# Patient Record
Sex: MALE | Race: OTHER | Employment: FULL TIME | URBAN - NONMETROPOLITAN AREA
[De-identification: names, ages, dates, MRNs, and addresses within clinical notes are randomized per-mention and may not be internally consistent; named-entity substitution may affect disease eponyms.]

---

## 2019-09-24 ENCOUNTER — HOSPITAL ENCOUNTER (OUTPATIENT)
Dept: GENERAL RADIOLOGY | Age: 44
Discharge: HOME | End: 2019-09-26
Payer: MEDICARE

## 2019-09-24 ENCOUNTER — HOSPITAL ENCOUNTER (OUTPATIENT)
Age: 44
Discharge: HOME | End: 2019-09-26
Payer: MEDICARE

## 2019-09-24 DIAGNOSIS — M25.512 LEFT SHOULDER PAIN, UNSPECIFIED CHRONICITY: ICD-10-CM

## 2019-09-24 PROCEDURE — 73030 X-RAY EXAM OF SHOULDER: CPT

## 2021-01-08 ENCOUNTER — HOSPITAL ENCOUNTER (EMERGENCY)
Age: 46
Discharge: HOME | End: 2021-01-08
Attending: FAMILY MEDICINE
Payer: MEDICARE

## 2021-01-08 VITALS
HEART RATE: 79 BPM | OXYGEN SATURATION: 99 % | HEIGHT: 62 IN | TEMPERATURE: 98.1 F | SYSTOLIC BLOOD PRESSURE: 132 MMHG | BODY MASS INDEX: 26.87 KG/M2 | DIASTOLIC BLOOD PRESSURE: 98 MMHG | WEIGHT: 146 LBS | RESPIRATION RATE: 18 BRPM

## 2021-01-08 DIAGNOSIS — S39.012A STRAIN OF LUMBAR REGION, INITIAL ENCOUNTER: Primary | ICD-10-CM

## 2021-01-08 DIAGNOSIS — J06.9 VIRAL UPPER RESPIRATORY TRACT INFECTION: ICD-10-CM

## 2021-01-08 PROCEDURE — C9803 HOPD COVID-19 SPEC COLLECT: HCPCS

## 2021-01-08 PROCEDURE — 99282 EMERGENCY DEPT VISIT SF MDM: CPT

## 2021-01-08 PROCEDURE — U0003 INFECTIOUS AGENT DETECTION BY NUCLEIC ACID (DNA OR RNA); SEVERE ACUTE RESPIRATORY SYNDROME CORONAVIRUS 2 (SARS-COV-2) (CORONAVIRUS DISEASE [COVID-19]), AMPLIFIED PROBE TECHNIQUE, MAKING USE OF HIGH THROUGHPUT TECHNOLOGIES AS DESCRIBED BY CMS-2020-01-R: HCPCS

## 2021-01-08 RX ORDER — PSEUDOEPHEDRINE HCL 60 MG/1
60 TABLET ORAL 2 TIMES DAILY
Qty: 20 TABLET | Refills: 0 | Status: SHIPPED | OUTPATIENT
Start: 2021-01-08 | End: 2021-06-08

## 2021-01-08 RX ORDER — CYCLOBENZAPRINE HCL 10 MG
10 TABLET ORAL 3 TIMES DAILY PRN
Qty: 30 TABLET | Refills: 0 | Status: SHIPPED | OUTPATIENT
Start: 2021-01-08 | End: 2021-01-18

## 2021-01-08 ASSESSMENT — ENCOUNTER SYMPTOMS
RHINORRHEA: 1
COUGH: 0
BACK PAIN: 1
SHORTNESS OF BREATH: 0

## 2021-01-09 ENCOUNTER — CARE COORDINATION (OUTPATIENT)
Dept: CARE COORDINATION | Age: 46
End: 2021-01-09

## 2021-01-09 NOTE — ED PROVIDER NOTES
None    Number of children: None    Years of education: None    Highest education level: None   Occupational History    None   Social Needs    Financial resource strain: None    Food insecurity     Worry: None     Inability: None    Transportation needs     Medical: None     Non-medical: None   Tobacco Use    Smoking status: Never Smoker    Smokeless tobacco: Never Used   Substance and Sexual Activity    Alcohol use: No    Drug use: No    Sexual activity: None   Lifestyle    Physical activity     Days per week: None     Minutes per session: None    Stress: None   Relationships    Social connections     Talks on phone: None     Gets together: None     Attends Zoroastrian service: None     Active member of club or organization: None     Attends meetings of clubs or organizations: None     Relationship status: None    Intimate partner violence     Fear of current or ex partner: None     Emotionally abused: None     Physically abused: None     Forced sexual activity: None   Other Topics Concern    None   Social History Narrative    None       SCREENINGS                        PHYSICAL EXAM    (up to 7 for level 4, 8 or more for level 5)     ED Triage Vitals [01/08/21 1818]   BP Temp Temp Source Pulse Resp SpO2 Height Weight   (!) 132/98 98.1 °F (36.7 °C) Temporal 79 18 99 % 5' 2\" (1.575 m) 146 lb (66.2 kg)       Physical Exam  Vitals signs reviewed. Constitutional:       Appearance: He is not ill-appearing. HENT:      Head: Atraumatic. Nose: Congestion and rhinorrhea present. Mouth/Throat:      Mouth: Mucous membranes are moist.      Pharynx: No posterior oropharyngeal erythema. Eyes:      General:         Right eye: No discharge. Left eye: No discharge. Pupils: Pupils are equal, round, and reactive to light. Neck:      Musculoskeletal: Neck supple. No muscular tenderness. Cardiovascular:      Rate and Rhythm: Normal rate and regular rhythm.       Heart sounds: Normal heart sounds. No murmur. Pulmonary:      Effort: Pulmonary effort is normal. No respiratory distress. Breath sounds: Normal breath sounds. Abdominal:      General: Abdomen is flat. Palpations: Abdomen is soft. Tenderness: There is no abdominal tenderness. There is no right CVA tenderness or left CVA tenderness. Musculoskeletal:         General: No tenderness or signs of injury. Comments: Right-sided paralumbar muscular discomfort   Skin:     General: Skin is warm. Capillary Refill: Capillary refill takes less than 2 seconds. Findings: No lesion or rash. Neurological:      General: No focal deficit present. Mental Status: He is alert and oriented to person, place, and time. Cranial Nerves: No cranial nerve deficit. Motor: No weakness. Psychiatric:         Mood and Affect: Mood normal.         Behavior: Behavior normal.         DIAGNOSTIC RESULTS     EKG: All EKG's are interpreted by the Emergency Department Physician who either signs or Co-signs this chart in the absence of a cardiologist.        RADIOLOGY:   Non-plain film images such as CT, Ultrasound and MRI are read by the radiologist. Plain radiographic images are visualized and preliminarily interpreted by the emergency physician with the below findings:        Interpretation per the Radiologist below, if available at the time of this note:    No orders to display         ED BEDSIDE ULTRASOUND:   Performed by ED Physician - none    LABS:  Labs Reviewed   COVID-19       All other labs were within normal range or not returned as of this dictation.     EMERGENCY DEPARTMENT COURSE and DIFFERENTIAL DIAGNOSIS/MDM:   Vitals:    Vitals:    01/08/21 1818   BP: (!) 132/98   Pulse: 79   Resp: 18   Temp: 98.1 °F (36.7 °C)   TempSrc: Temporal   SpO2: 99%   Weight: 146 lb (66.2 kg)   Height: 5' 2\" (1.575 m)           MDM  Number of Diagnoses or Management Options  Diagnosis management comments: Exam mostly consistent with upper respiratory tract infection but will test for Covid. Back exam is consistent with lumbar strain        REASSESSMENT          CRITICAL CARE TIME       CONSULTS:  None    PROCEDURES:  Unless otherwise noted below, none     Procedures      FINAL IMPRESSION      1. Strain of lumbar region, initial encounter    2. Viral upper respiratory tract infection          DISPOSITION/PLAN   DISPOSITION Decision To Discharge 01/08/2021 07:41:47 PM      PATIENT REFERRED TO:  No follow-up provider specified. DISCHARGE MEDICATIONS:  New Prescriptions    No medications on file     Controlled Substances Monitoring:     No flowsheet data found.     (Please note that portions of this note were completed with a voice recognition program.  Efforts were made to edit the dictations but occasionally words are mis-transcribed.)    Conner Sher MD (electronically signed)  Attending Emergency Physician            Conner Sher MD  01/08/21 1944       Conner Sher MD  01/08/21 2971

## 2021-01-09 NOTE — CARE COORDINATION
Heather Garcia was seen [facility] 2021- 12:51 pm Left message requesting return call re: Initial ER/Covid F/U call. 2021- Received message from spouse requesting return call - called cell phone number she left- unable to leave message- voice mail not set up. Called home number and LM requesting return call. Spoke with Heather Garcia. He denied any pain, fever, SOB, congestion. He has lost sense of smell. He is in quarantine in home. His spouse and son have no symptoms. We discussed establishing PCP. Given 419-Same Day. We discussed staying hydrated and eating healthy- he voiced understanding. He stated he does feel fine today. He will awiat hos test results. He has this writer's contact information if needed. He declined My Chart and Get Well Loop. Patient contacted regarding Marquita Batters. Discussed COVID-19 related testing which was pending at this time. Test results were pending. Patient informed of results, if available? Pending    Care Transition Nurse/ Ambulatory Care Manager contacted the patient by telephone to perform post discharge assessment. Call within 2 business days of discharge: Yes. Verified name and  with patient as identifiers. Provided introduction to self, and explanation of the CTN/ACM role, and reason for call due to risk factors for infection and/or exposure to COVID-19. Symptoms reviewed with patient who verbalized the following symptoms: loss of taste or smell, no new symptoms and no worsening symptoms. Due to no new or worsening symptoms encounter was not routed to provider for escalation. Discussed follow-up appointments. If no appointment was previously scheduled, appointment scheduling offered: Yes- given 419-Same Day  [facility] follow up appointment(s): No future appointments.   Non-[facility] follow up appointment(s): n/a    Non-face-to-face services provided:  Obtained and reviewed discharge summary and/or continuity of care documents     Advance Care Planning:   Does patient have an Advance Directive:  decision maker updated. Patient has following risk factors of: no known risk factors. CTN/ACM reviewed discharge instructions, medical action plan and red flags such as increased shortness of breath, increasing fever and signs of decompensation with patient who verbalized understanding. Discussed exposure protocols and quarantine with CDC Guidelines What to do if you are sick with coronavirus disease 2019.  Patient was given an opportunity for questions and concerns. The patient agrees to contact the [facility] exposure line [phone], [facility] department [place] Department [facility] Health: ([phone]) and PCP office for questions related to their healthcare. CTN/ACM provided contact information for future needs. Reviewed and educated patient on any new and changed medications related to discharge diagnosis     Patient/family/caregiver given information for GetWell Loop and agrees to enroll declined  Patient's preferred e-mail: n/a   Patient's preferred phone number: n/a  Based on Loop alert triggers, patient will be contacted by nurse care manager for worsening symptoms. Plan for follow-up call in 1-2 days based on severity of symptoms and risk factors.

## 2021-01-11 LAB — SARS-COV-2, NAA: DETECTED

## 2021-01-11 NOTE — ACP (ADVANCE CARE PLANNING)
Advance Care Planning   Healthcare Decision Maker:    Primary Decision Maker: Shari Claude - [phone]    Click here to complete Healthcare Decision Makers including selection of the Healthcare Decision Maker Relationship (ie \"Primary\")

## 2021-01-13 ENCOUNTER — CARE COORDINATION (OUTPATIENT)
Dept: CARE COORDINATION | Age: 46
End: 2021-01-13

## 2021-01-19 ENCOUNTER — CARE COORDINATION (OUTPATIENT)
Dept: CARE COORDINATION | Age: 46
End: 2021-01-19

## 2021-05-25 ENCOUNTER — APPOINTMENT (OUTPATIENT)
Dept: GENERAL RADIOLOGY | Age: 46
End: 2021-05-25
Payer: MEDICARE

## 2021-05-25 ENCOUNTER — HOSPITAL ENCOUNTER (EMERGENCY)
Age: 46
Discharge: HOME | End: 2021-05-25
Attending: FAMILY MEDICINE
Payer: MEDICARE

## 2021-05-25 VITALS
DIASTOLIC BLOOD PRESSURE: 80 MMHG | OXYGEN SATURATION: 99 % | HEART RATE: 78 BPM | TEMPERATURE: 98.3 F | RESPIRATION RATE: 20 BRPM | SYSTOLIC BLOOD PRESSURE: 133 MMHG

## 2021-05-25 DIAGNOSIS — Z78.9 DIPHTHERIA-PERTUSSIS-TETANUS (DPT) VACCINATION ADMINISTERED AT CURRENT VISIT: ICD-10-CM

## 2021-05-25 DIAGNOSIS — S69.91XA INJURY OF RIGHT HAND, INITIAL ENCOUNTER: Primary | ICD-10-CM

## 2021-05-25 PROCEDURE — 6360000002 HC RX W HCPCS: Performed by: FAMILY MEDICINE

## 2021-05-25 PROCEDURE — 73110 X-RAY EXAM OF WRIST: CPT

## 2021-05-25 PROCEDURE — 90715 TDAP VACCINE 7 YRS/> IM: CPT | Performed by: FAMILY MEDICINE

## 2021-05-25 PROCEDURE — 99283 EMERGENCY DEPT VISIT LOW MDM: CPT

## 2021-05-25 PROCEDURE — 73130 X-RAY EXAM OF HAND: CPT

## 2021-05-25 PROCEDURE — 6370000000 HC RX 637 (ALT 250 FOR IP): Performed by: FAMILY MEDICINE

## 2021-05-25 PROCEDURE — 90471 IMMUNIZATION ADMIN: CPT | Performed by: FAMILY MEDICINE

## 2021-05-25 RX ORDER — SULFAMETHOXAZOLE AND TRIMETHOPRIM 800; 160 MG/1; MG/1
1 TABLET ORAL 2 TIMES DAILY
Qty: 20 TABLET | Refills: 0 | Status: SHIPPED | OUTPATIENT
Start: 2021-05-25 | End: 2021-06-04

## 2021-05-25 RX ORDER — SULFAMETHOXAZOLE AND TRIMETHOPRIM 800; 160 MG/1; MG/1
1 TABLET ORAL ONCE
Status: COMPLETED | OUTPATIENT
Start: 2021-05-25 | End: 2021-05-25

## 2021-05-25 RX ADMIN — TETANUS TOXOID, REDUCED DIPHTHERIA TOXOID AND ACELLULAR PERTUSSIS VACCINE, ADSORBED 0.5 ML: 5; 2.5; 8; 8; 2.5 SUSPENSION INTRAMUSCULAR at 21:48

## 2021-05-25 RX ADMIN — SULFAMETHOXAZOLE AND TRIMETHOPRIM 1 TABLET: 800; 160 TABLET ORAL at 22:41

## 2021-05-25 ASSESSMENT — PAIN DESCRIPTION - ORIENTATION: ORIENTATION: RIGHT

## 2021-05-25 ASSESSMENT — PAIN DESCRIPTION - ONSET: ONSET: ON-GOING

## 2021-05-25 ASSESSMENT — PAIN DESCRIPTION - LOCATION: LOCATION: HAND

## 2021-05-26 NOTE — PROGRESS NOTES
Per Dr. Abhishek Girard pt needs to return to ER for ulnar gutter or volar splint and ortho follow up. This nurse attempted to call patient at [address]. No voicemail set up and pt does not answer. This nurse has sent a certified letter to patient.

## 2021-05-26 NOTE — ED PROVIDER NOTES
[address] [facility]  eMERGENCY dEPARTMENT eNCOUnter          [address] [facility]       Chief Complaint   Patient presents with    Hand Injury     right hand pain from getting it hung up in a trash compacter and fork lift forks       Nurses Notes reviewed and I agree except as noted in the HPI. HISTORY OF PRESENT ILLNESS    Dagoberto Bailey is a 50 y.o. male who presents to the emergency room via private vehicle, patient states was at work when his hand got caught between the edge of the 4 clips and the compactor, resulting in superficial cut over the dorsum of the hand however there is been a large amount of swelling firmly over the dorsum of the right hand, the patient some limited ability to flex or fully extend the hand secondary to the swelling and discomfort. Patient denies other injury. Patient rates his discomfort 6 out of 10, constant, worse with some movements. Pain has not been increasing since time of injury. Patient unsure his last tetanus shot but thinks it has been within the last 10 years. REVIEW OF SYSTEMS     Review of Systems   All other systems reviewed and are negative. PAST MEDICAL HISTORY    has a past medical history of Biliary colic. SURGICAL HISTORY      has a past surgical history that includes Appendectomy (1991) and Cholecystectomy (6/11/2013). CURRENT MEDICATIONS       Discharge Medication List as of 5/25/2021 10:41 PM      CONTINUE these medications which have NOT CHANGED    Details   pseudoephedrine (SUDAFED) 60 MG tablet Take 1 tablet by mouth 2 times daily, Disp-20 tablet, R-0Normal      omeprazole (PRILOSEC) 10 MG capsule Take 1 capsule by mouth daily. , Disp-30 capsule, R-8             ALLERGIES     has No Known Allergies. FAMILY HISTORY     has no family status information on file. family history is not on file. SOCIAL HISTORY      reports that he has never smoked.  He has never used smokeless tobacco. He reports that he does not dislocation, contusion    DIAGNOSTIC RESULTS           RADIOLOGY: non-plain film images(s) such as CT, Ultrasound and MRI are read by the radiologist.  XR HAND RIGHT (MIN 3 VIEWS)   Final Result      Acute minimally displaced ulnar styloid fracture is seen. Additionally, subtle    lucency is seen about the ulnar aspect of the right hamate, suspicious for a    subtle minimally displaced fracture. Soft tissue swelling is seen about the    right hand. XR WRIST RIGHT (MIN 3 VIEWS)   Final Result         1. No acute fracture or dislocation of the right wrist is seen. If pain    persists, repeat radiographs are recommended in 7-10 days. LABS:   Labs Reviewed - No data to display    EMERGENCY DEPARTMENT COURSE:   Vitals:    Vitals:    05/25/21 2132   BP: 133/80   Pulse: 78   Resp: 20   Temp: 98.3 °F (36.8 °C)   TempSrc: Temporal   SpO2: 99%     Patient history and physical exam taken at bedside, discussed patient symptoms and exam findings, discussed initial work-up to get x-rays of the right wrist and hand and will reevaluate, updating tetanus shot, patient sitting upright in bed position of comfort, acknowledges    Tdap updated    Initial review of x-rays do not grossly appreciate any acute fracture, formal radiology report not available at this time. Discussed with patient my initial evaluation of x-rays, discussed wound care of the abrasion on the back of the hand, icing the wrist as desired, Motrin/Tylenol, outpatient follow-up with primary care as needed, acknowledged    During time of dictation, it is noted the formal radiology report is concerning for possible subtle lucency of the ulnar aspect of the right hamate suspicious for minimally displaced fracture. Will have nursing contact patient to bring him back for splint placement and sitting up for outpatient follow-up with orthopedics. FINAL IMPRESSION      1. Injury of right hand, initial encounter    2. Diphtheria-pertussis-tetanus (DPT) vaccination administered at current visit          DISPOSITION/PLAN   D/c    PATIENT REFERRED TO:  Angievika [address]  [address] [facility] [address]  Call       [facility] ED  [address] [facility] [address] [phone]    As needed, If symptoms worsen      DISCHARGE MEDICATIONS:  Discharge Medication List as of 5/25/2021 10:41 PM      START taking these medications    Details   sulfamethoxazole-trimethoprim (BACTRIM DS) 800-160 MG per tablet Take 1 tablet by mouth 2 times daily for 10 days, Disp-20 tablet, R-0Normal                 Summation      Patient Course:  D/c    ED Medications administered this visit:    Medications   Tetanus-Diphth-Acell Pertussis (BOOSTRIX) injection 0.5 mL (0.5 mLs Intramuscular Given 5/25/21 2148)   sulfamethoxazole-trimethoprim (BACTRIM DS;SEPTRA DS) 800-160 MG per tablet 1 tablet (1 tablet Oral Given 5/25/21 2241)       New Prescriptions from this visit:    Discharge Medication List as of 5/25/2021 10:41 PM      START taking these medications    Details   sulfamethoxazole-trimethoprim (BACTRIM DS) 800-160 MG per tablet Take 1 tablet by mouth 2 times daily for 10 days, Disp-20 tablet, R-0Normal             Follow-up:  Igor 59  [address] [facility] [id number] [phone]  Call       [facility] ED  [address] [facility] [address] [phone]    As needed, If symptoms worsen        Final Impression:   1. Injury of right hand, initial encounter    2.  Diphtheria-pertussis-tetanus (DPT) vaccination administered at current visit               (Please note that portions of this note were completed with a voice recognition program.  Efforts were made to edit the dictations but occasionally words are mis-transcribed.)    Steven Gaucher, MD Steven Gaucher, MD  05/26/21 9663

## 2021-06-08 ENCOUNTER — HOSPITAL ENCOUNTER (EMERGENCY)
Age: 46
Discharge: HOME | End: 2021-06-08
Attending: INTERNAL MEDICINE
Payer: MEDICARE

## 2021-06-08 ENCOUNTER — APPOINTMENT (OUTPATIENT)
Dept: GENERAL RADIOLOGY | Age: 46
End: 2021-06-08
Payer: MEDICARE

## 2021-06-08 VITALS
TEMPERATURE: 98.9 F | SYSTOLIC BLOOD PRESSURE: 110 MMHG | OXYGEN SATURATION: 98 % | WEIGHT: 142 LBS | HEIGHT: 62 IN | BODY MASS INDEX: 26.13 KG/M2 | DIASTOLIC BLOOD PRESSURE: 71 MMHG | HEART RATE: 85 BPM | RESPIRATION RATE: 18 BRPM

## 2021-06-08 DIAGNOSIS — S52.601S CLOSED FRACTURE OF DISTAL END OF RIGHT ULNA, UNSPECIFIED FRACTURE MORPHOLOGY, SEQUELA: Primary | ICD-10-CM

## 2021-06-08 PROCEDURE — 99283 EMERGENCY DEPT VISIT LOW MDM: CPT

## 2021-06-08 PROCEDURE — 73130 X-RAY EXAM OF HAND: CPT

## 2021-06-08 PROCEDURE — 29125 APPL SHORT ARM SPLINT STATIC: CPT

## 2021-06-08 ASSESSMENT — PAIN SCALES - GENERAL: PAINLEVEL_OUTOF10: 3

## 2021-06-08 ASSESSMENT — PAIN DESCRIPTION - ORIENTATION: ORIENTATION: RIGHT

## 2021-06-08 ASSESSMENT — PAIN DESCRIPTION - PAIN TYPE: TYPE: ACUTE PAIN

## 2021-06-08 ASSESSMENT — PAIN DESCRIPTION - LOCATION: LOCATION: HAND

## 2021-06-08 NOTE — ED PROVIDER NOTES
[facility] ED  EMERGENCY DEPARTMENT ENCOUNTER      Pt Name: Gaviota Hammonds  MRN: [id number]  [place] 1975  Date of evaluation: 6/8/2021  Provider: Anika Gu MD    [address] [place] [address]       Chief Complaint   Patient presents with    Other         HISTORY OF PRESENT ILLNESS   (Location/Symptom, Timing/Onset, Context/Setting, Quality, Duration, Modifying Factors, Severity)  Note limiting factors. Gaviota Hammonds is a 50 y.o. male who has a history of appendectomy, cholecystectomy, presents to the emergency department for evaluation and management of fracture. He was seen here on 5/25/2021 and diagnosed with a fracture of the right ulnar styloid process and hamate bone after discharge. He was notified that he should return for splinting and further management. 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 He continues to have pain in the right wrist and hand. He has been busy taking his wife to [address] [place] [address] who has a brain tumor. HPI    Nursing Notes were reviewed. REVIEW OF SYSTEMS    (2-9 systems for level 4, 10 or more for level 5)       REVIEW OF SYSTEMS    Constitutional: Negative for fatigue and fever. Musculoskeletal: Positive for right wrist and hand pain, swelling, Negative for arthralgias, back pain and neck pain. Skin: Negative for wound, laceration, color change, pallor, rash     Except as noted above the remainder of the review of systems was reviewed and negative.        PASTMEDICAL HISTORY     Past Medical History:   Diagnosis Date    Biliary colic 9/99/5644         SURGICAL HISTORY       Past Surgical History:   Procedure Laterality Date    APPENDECTOMY  1991    in [address] [place] [address]  6/11/2013    LAPAROSCOPIC         CURRENT MEDICATIONS       Discharge Medication List as of 6/8/2021  6:25 PM      CONTINUE these medications which have NOT CHANGED    Details   Sulfamethoxazole-Trimethoprim (BACTRIM DS PO) Take by mouthHistorical Med      omeprazole (PRILOSEC) 10 MG capsule Take 1 capsule by mouth daily. , Disp-30 capsule, R-8             ALLERGIES     Patient has no known allergies. FAMILY HISTORY     No family history on file. SOCIAL HISTORY       Social History     Socioeconomic History    Marital status:      Spouse name: Not on file    Number of children: Not on file    Years of education: Not on file    Highest education level: Not on file   Occupational History    Not on file   Tobacco Use    Smoking status: Never Smoker    Smokeless tobacco: Never Used   Substance and Sexual Activity    Alcohol use: No    Drug use: No    Sexual activity: Not on file   Other Topics Concern    Not on file   Social History Narrative    Not on file     Social Determinants of Health     Financial Resource Strain:     Difficulty of Paying Living Expenses:    Food Insecurity:     Worried About Running Out of Food in the Last Year:     [address] in the Last Year:    Transportation Needs:     Lack of Transportation (Medical):      Lack of Transportation (Non-Medical):    Physical Activity:     Days of Exercise per Week:     Minutes of Exercise per Session:    Stress:     Feeling of Stress :    Social Connections:     Frequency of Communication with Friends and Family:     Frequency of Social Gatherings with Friends and Family:     Attends Baptism Services:     Active Member of Clubs or Organizations:     Attends Club or Organization Meetings:     Marital Status:    Intimate Partner Violence:     Fear of Current or Ex-Partner:     Emotionally Abused:     Physically Abused:     Sexually Abused:        SCREENINGS    [place] Coma Scale  Eye Opening: Spontaneous  Best Verbal Response: Oriented  Best Motor Response: Obeys commands  Luis Fernando Coma Scale Score: 15        PHYSICAL EXAM    (up to 7 for level 4, 8 or more for level 5)     ED Triage Vitals   BP Temp Temp Source Pulse Resp SpO2 Height Weight   06/08/21 1656 06/08/21 1656 06/08/21 1656 06/08/21 1656 06/08/21 1656 06/08/21 1656 06/08/21 1658 06/08/21 1658   110/71 98.9 °F (37.2 °C) Oral 85 18 98 % 5' 2\" (1.575 m) 142 lb (64.4 kg)       Physical Exam  Physical Exam   Constitutional:  Appears well, well-developed and well-nourished. No distress noted. Non toxic in appearance  HENT:     Head: Normocephalic and atraumatic. No palpable tenderness. Nose: Nose normal. No rhinorrhea or bleeding observed. Mouth/Throat: Oropharynx is clear and mucosa moist  Eyes: Conjunctivae and EOM are normal. Pupils are equal, round, and reactive to light. No scleral icterus. No tearing or drainage. Neck: Normal range of motion. Neck supple. No tracheal deviation present. Cardiovascular: Normal rate, regular rhythm, normal heart sounds and intact distal pulses. Exam reveals no gallop or friction rub. No murmur heard. Pulmonary/Chest: Effort normal and breath sounds are symmetric and normal. No respiratory distress. There are no wheezes, rales or rhonchi. Abdominal: Soft. Bowel sounds are normal. No distension or no mass exhibitted. No organomegaly. There is no tenderness, rebound, rigidity or guarding. Genitourinary:   No CVA tenderness noted on examination. Musculoskeletal: Examination of the right hand shows that there is tenderness along the distal ulna and carpal bones. Swelling is noted over the carpal bones. An indurated scar is identified over a healing wound over the carpal.  No snuffbox tenderness. Normal range of motion which elicits tenderness with pronation supination. Normal flexion, extension and apposition of fingers, active, passive and against resistance. Lymphadenopathy:  No cervical adenopathy. Neurological:   Alert and oriented to person, place, and time. Reflexes are normal.  There are no cranial nerve deficits. Normal muscle tone, motor and sensory function exhibitted.   Strength 5/5 in all extremities and torso.  Coordination and gait normal.   Skin: There is a 3 cm scab on the dorsum of the right wrist with induration but no erythema, drainage, warmth. Skin is warm and dry. No rash noted. No diaphoresis. No erythema. No pallor. Psychiatric:  normal mood and affect. Behavior is  normal. Judgment and thought content normal.     DIAGNOSTIC RESULTS     EKG: All EKG's are interpreted by the Emergency Department Physician who either signs or Co-signs this chart in the absence of a cardiologist.    Not indicated. RADIOLOGY:   Non-plain film images such as CT, Ultrasoundand MRI are read by the radiologist. Plain radiographic images are visualized and preliminarily interpreted by the emergency physician with the below findings:    Not indicated. Interpretation per the Radiologist below, if available at the time of this note:    XR HAND RIGHT (MIN 3 VIEWS)   Final Result   FINDINGS/IMPRESSION:       1. No radiographic evidence of acute osseous abnormality of the right hand. 2. Suggestion of positive ulnar variance. ED BEDSIDE ULTRASOUND:   Performed by ED Physician - none    LABS:  Labs Reviewed - No data to display    All other labs were within normal range or not returned as of this dictation. EMERGENCY DEPARTMENT COURSE and DIFFERENTIAL DIAGNOSIS/MDM:   Vitals:    Vitals:    06/08/21 1656 06/08/21 1658   BP: 110/71    Pulse: 85    Resp: 18    Temp: 98.9 °F (37.2 °C)    TempSrc: Oral    SpO2: 98%    Weight:  142 lb (64.4 kg)   Height:  5' 2\" (1.575 m)       Noted    MDM    CRITICAL CARE TIME   Total Critical Care time was 0 minutes. EDCOURSE       CONSULTS:  None    PROCEDURES:  Unless otherwise noted below, none     Procedures      Caleb Murphy is a 50 y.o. male who has a history of appendectomy, cholecystectomy, presented for right wrist and hand fracture.   Imaging showed that there are no fractures today but because of continued pain he was splinted and PM             (Please note that portions of this note were completed with a voice recognition program.  Efforts were made to edit the dictations but occasionally words are mis-transcribed.)    Chandan Herman MD (electronically signed)  Attending Emergency Physician          Chandan Herman MD  06/11/21 1890

## 2021-06-08 NOTE — ED NOTES
6/8/2021 at [facility] Pt call regarding certified letter he received. Pt informed of the reason for the certified letter.  Pt reports \"have to talk to wife first and then may come in.\"     Jorgito Ramires RN  06/08/21 4725

## 2022-08-07 ENCOUNTER — APPOINTMENT (OUTPATIENT)
Dept: GENERAL RADIOLOGY | Age: 47
End: 2022-08-07
Payer: MEDICARE

## 2022-08-07 ENCOUNTER — HOSPITAL ENCOUNTER (EMERGENCY)
Age: 47
Discharge: HOME | End: 2022-08-07
Attending: EMERGENCY MEDICINE
Payer: MEDICARE

## 2022-08-07 VITALS
HEART RATE: 78 BPM | OXYGEN SATURATION: 98 % | TEMPERATURE: 97.8 F | BODY MASS INDEX: 23.92 KG/M2 | SYSTOLIC BLOOD PRESSURE: 125 MMHG | WEIGHT: 135 LBS | HEIGHT: 63 IN | RESPIRATION RATE: 18 BRPM | DIASTOLIC BLOOD PRESSURE: 86 MMHG

## 2022-08-07 DIAGNOSIS — S99.921A INJURY OF TOE ON RIGHT FOOT, INITIAL ENCOUNTER: Primary | ICD-10-CM

## 2022-08-07 PROCEDURE — 73630 X-RAY EXAM OF FOOT: CPT

## 2022-08-07 PROCEDURE — 99283 EMERGENCY DEPT VISIT LOW MDM: CPT

## 2022-08-07 PROCEDURE — 2500000003 HC RX 250 WO HCPCS: Performed by: EMERGENCY MEDICINE

## 2022-08-07 RX ORDER — LIDOCAINE HYDROCHLORIDE 10 MG/ML
5 INJECTION, SOLUTION INFILTRATION; PERINEURAL ONCE
Status: COMPLETED | OUTPATIENT
Start: 2022-08-07 | End: 2022-08-07

## 2022-08-07 RX ADMIN — LIDOCAINE HYDROCHLORIDE 5 ML: 10 INJECTION, SOLUTION INFILTRATION; PERINEURAL at 12:57

## 2022-08-07 ASSESSMENT — PAIN DESCRIPTION - LOCATION: LOCATION: FOOT

## 2022-08-07 ASSESSMENT — PAIN DESCRIPTION - DESCRIPTORS: DESCRIPTORS: THROBBING;TINGLING;NUMBNESS

## 2022-08-07 ASSESSMENT — PAIN DESCRIPTION - PAIN TYPE: TYPE: ACUTE PAIN

## 2022-08-07 ASSESSMENT — PAIN SCALES - GENERAL: PAINLEVEL_OUTOF10: 7

## 2022-08-07 ASSESSMENT — PAIN DESCRIPTION - FREQUENCY: FREQUENCY: CONTINUOUS

## 2022-08-07 ASSESSMENT — PAIN - FUNCTIONAL ASSESSMENT: PAIN_FUNCTIONAL_ASSESSMENT: 0-10

## 2022-08-07 ASSESSMENT — PAIN DESCRIPTION - ORIENTATION: ORIENTATION: RIGHT

## 2022-08-07 NOTE — LETTER
[facility] ED  [address] [place], [address]  Phone: [phone]               August 7, 2022    Patient: Fabrizio Stoner   YOB: 1975   Date of Visit: 8/7/2022       To Whom It May Concern:    Betty England was seen and treated in our emergency department on 8/7/2022. He may return to work on 08/10/2022.       Sincerely,       Negra Rebollar RN         Signature:__________________________________

## 2022-08-07 NOTE — ED PROVIDER NOTES
eMERGENCY dEPARTMENT eNCOUnter        [facility]    Chief Complaint   Patient presents with    Foot Injury     Pt states had a dresser drop on his toes on his right foot, has pain on his 2nd and 3rd toe on right foot. HPI    Mariama Reddy is a 48 y.o. male who presents to ED from home. By car. With complaint of R foot injury. Onset PTA  Intensity of symptoms moderate pain in the right foot second and third toes. Patient states that he was carrying a dresser and dropped a dresser accidentally on his foot. .  No other injuries. REVIEW OF SYSTEMS    All systems reviewed and positives are in the HPI      PAST MEDICAL HISTORY    Past Medical History:   Diagnosis Date    Biliary colic 9/33/9563       SURGICAL HISTORY    Past Surgical History:   Procedure Laterality Date    APPENDECTOMY  1991    in [address]  6/11/2013    LAPAROSCOPIC       CURRENT MEDICATIONS    Current Outpatient Rx   Medication Sig Dispense Refill    Sulfamethoxazole-Trimethoprim (BACTRIM DS PO) Take by mouth (Patient not taking: Reported on 8/7/2022)      omeprazole (PRILOSEC) 10 MG capsule Take 1 capsule by mouth daily. 30 capsule 8       ALLERGIES    No Known Allergies    FAMILY HISTORY    History reviewed. No pertinent family history.     SOCIAL HISTORY    Social History     Socioeconomic History    Marital status:      Spouse name: None    Number of children: None    Years of education: None    Highest education level: None   Tobacco Use    Smoking status: Never    Smokeless tobacco: Never   Vaping Use    Vaping Use: Never used   Substance and Sexual Activity    Alcohol use: No    Drug use: No       PHYSICAL EXAM    VITAL SIGNS: /86   Pulse 78   Temp 97.8 °F (36.6 °C) (Oral)   Resp 18   Ht 5' 3\" (1.6 m)   Wt 135 lb (61.2 kg)   SpO2 98%   BMI 23.91 kg/m²   Constitutional:  Well developed, well nourished, no acute distress, non-toxic appearance   HENT:  Atraumatic, external ears normal, nose normal, oropharynx moist.  Neck- normal range of motion, no tenderness, supple   Respiratory:  No respiratory distress, normal breath sounds. Cardiovascular:  Normal rate, normal rhythm, no murmurs, no gallops, no rubs   GI:  Soft, nondistended, normal bowel sounds, nontender   Musculoskeletal: Right foot with injury to the second and third toes with partial avulsion of the toenail of the second toe. Small flap partial avulsion laceration of the third toe  Integument:  Well hydrated, no rash   Neurologic: Negative    RADIOLOGY/PROCEDURES    XR FOOT RIGHT (MIN 3 VIEWS)   Final Result   FINDINGS/IMPRESSION:      1. Mild soft tissue swelling is suspected in the forefoot. 2. No fracture, malalignment, significant or other acute bony abnormality is    seen. Labs  Labs Reviewed - No data to display          Summation      Patient Course: Patient states that he dropped a dresser on his right foot. ED Medications administered this visit:    Medications   lidocaine 1 % injection 5 mL (5 mLs IntraDERmal Given 8/7/22 1257)       New Prescriptions from this visit:    New Prescriptions    No medications on file       Follow-up:  Reynaldo Landa DPM  [address] [place] [address]  [phone]    Schedule an appointment as soon as possible for a visit in 1 day  As needed, If symptoms worsen        Final Impression:   1.  Injury of toe on right foot, initial encounter               (Please note that portions of this note were completed with a voice recognition program.  Efforts were made to edit the dictations but occasionally words are mis-transcribed.)          Jeyson Kaye MD  08/07/22 8813

## 2023-04-27 ENCOUNTER — HOSPITAL ENCOUNTER (EMERGENCY)
Age: 48
Discharge: HOME | End: 2023-04-27
Attending: FAMILY MEDICINE
Payer: MEDICAID

## 2023-04-27 VITALS
TEMPERATURE: 97.8 F | HEIGHT: 61 IN | HEART RATE: 94 BPM | DIASTOLIC BLOOD PRESSURE: 80 MMHG | OXYGEN SATURATION: 98 % | SYSTOLIC BLOOD PRESSURE: 135 MMHG | RESPIRATION RATE: 18 BRPM | BODY MASS INDEX: 28.32 KG/M2 | WEIGHT: 150 LBS

## 2023-04-27 DIAGNOSIS — N50.82 SCROTUM PAIN: Primary | ICD-10-CM

## 2023-04-27 PROCEDURE — 99282 EMERGENCY DEPT VISIT SF MDM: CPT

## 2023-04-28 ENCOUNTER — HOSPITAL ENCOUNTER (OUTPATIENT)
Dept: ULTRASOUND IMAGING | Age: 48
End: 2023-04-28
Payer: MEDICAID

## 2023-04-28 DIAGNOSIS — N50.82 SCROTUM PAIN: ICD-10-CM

## 2023-04-28 PROCEDURE — 93976 VASCULAR STUDY: CPT

## 2023-04-28 NOTE — ED PROVIDER NOTES
[address] [place] [address]      Pt Name: Shana Sharp  MRN: [id number]  [place] 1975  Date of evaluation: 4/27/2023  Provider: Gaviota Xie MD    CHIEF COMPLAINT       Chief Complaint   Patient presents with    Groin Swelling     Patient arrives to ER today with complaints of left intermittent testicle pain that patient reports has been coming and going for the last year. HISTORY OF PRESENT ILLNESS      Dagoberto Isaacs is a 50 y.o. male who presents to the emergency department via private vehicle with his son, patient complaining of proxy 1 month of left intermittent pain indicating area superior left testicle, denies any trauma or falls affecting the area, denies any dysuria or hematuria, denies any external sores or possibility of STD, denies any penile discharge or penile pain or swelling. Patient began noticing a bump on the upper portion of the left testicle that seems to come and go at times, can be uncomfortable, worse when he is wearing tighter jeans, or at times when he is at work. Feels he has to straighten up his leg to relieve the discomfort. At times he feels the pain is coming going up into his stomach area. REVIEW OF SYSTEMS       Review of Systems   Genitourinary:  Positive for testicular pain. Negative for dysuria, flank pain, genital sores, hematuria, penile discharge, penile pain and penile swelling. All other systems reviewed and are negative.       PAST MEDICAL HISTORY     Past Medical History:   Diagnosis Date    Biliary colic 7/15/0403         SURGICAL HISTORY       Past Surgical History:   Procedure Laterality Date    APPENDECTOMY  1991    in [address]  6/11/2013    LAPAROSCOPIC         CURRENT MEDICATIONS       Discharge Medication List as of 4/27/2023 11:13 PM        CONTINUE these medications which have NOT CHANGED    Details   Sulfamethoxazole-Trimethoprim (BACTRIM DS PO) Take by mouthHistorical Med

## 2023-04-28 NOTE — ED NOTES
Scrotal assessment deferred to physician Dr. Miquel Donis due to patient request of writer to step out of the room for assessment.       Marylen Albany, RN  04/27/23 7731       Marylen Albany, RN  04/27/23 6285

## 2023-05-08 ENCOUNTER — OFFICE VISIT (OUTPATIENT)
Dept: UROLOGY | Age: 48
End: 2023-05-08
Payer: MEDICAID

## 2023-05-08 VITALS
DIASTOLIC BLOOD PRESSURE: 78 MMHG | SYSTOLIC BLOOD PRESSURE: 119 MMHG | HEART RATE: 76 BPM | BODY MASS INDEX: 28.04 KG/M2 | WEIGHT: 148.4 LBS | TEMPERATURE: 99 F

## 2023-05-08 DIAGNOSIS — N50.3 EPIDIDYMAL CYST: Primary | ICD-10-CM

## 2023-05-08 PROCEDURE — 99204 OFFICE O/P NEW MOD 45 MIN: CPT | Performed by: UROLOGY

## 2023-05-08 ASSESSMENT — ENCOUNTER SYMPTOMS
COLOR CHANGE: 0
VOMITING: 0
WHEEZING: 0
BACK PAIN: 0
CONSTIPATION: 0
EYE REDNESS: 0
NAUSEA: 0
SHORTNESS OF BREATH: 0
ABDOMINAL PAIN: 0
COUGH: 0

## 2023-05-08 NOTE — PROGRESS NOTES
HPI:          Patient is a 50 y.o. male in no acute distress. He is alert and oriented to person, place, and time. Patient being seen here today as a new patient. Patient was referred to us by Dr. Castillo President for orchialgia. Patient did obtain a recent scrotal ultrasound. This film was independently reviewed today. This does show a small right hydrocele and a benign left epididymal cyst.  Patient is not currently having any pain. He was not given any medications in the emergency department. Patient does state that when he is warm that his testicles are lower and when he is cold that his testicles are higher in the scrotum. I did tell him that this was normal physiologic response to temperature. Patient has had no issues with gross hematuria or dysuria. Patient has had no issues having children in the past.  He has never seen urology before. He reports no flank pain nausea vomiting today. Past Medical History:   Diagnosis Date    Biliary colic 91/93/7628    Kidney stone      Past Surgical History:   Procedure Laterality Date    APPENDECTOMY  1991    in [address]  6/11/2013    LAPAROSCOPIC     Outpatient Encounter Medications as of 5/8/2023   Medication Sig Dispense Refill    Sulfamethoxazole-Trimethoprim (BACTRIM DS PO) Take by mouth (Patient not taking: Reported on 8/7/2022)      omeprazole (PRILOSEC) 10 MG capsule Take 1 capsule by mouth daily. (Patient not taking: Reported on 5/8/2023) 30 capsule 8     No facility-administered encounter medications on file as of 5/8/2023. Current Outpatient Medications on File Prior to Visit   Medication Sig Dispense Refill    Sulfamethoxazole-Trimethoprim (BACTRIM DS PO) Take by mouth (Patient not taking: Reported on 8/7/2022)      omeprazole (PRILOSEC) 10 MG capsule Take 1 capsule by mouth daily. (Patient not taking: Reported on 5/8/2023) 30 capsule 8     No current facility-administered medications on file prior to visit.      Patient

## 2023-05-08 NOTE — PATIENT INSTRUCTIONS
SURVEY:    You may be receiving a survey from [employer] regarding your visit today. Please complete the survey to enable us to provide the highest quality of care to you and your family. If you cannot score us a very good on any question, please call the office to discuss how we could have made your experience a very good one. Thank you.

## 2023-06-24 ENCOUNTER — APPOINTMENT (OUTPATIENT)
Dept: GENERAL RADIOLOGY | Age: 48
End: 2023-06-24
Payer: MEDICAID

## 2023-06-24 ENCOUNTER — HOSPITAL ENCOUNTER (EMERGENCY)
Age: 48
Discharge: HOME | End: 2023-06-24
Attending: EMERGENCY MEDICINE
Payer: MEDICAID

## 2023-06-24 VITALS
HEART RATE: 76 BPM | HEIGHT: 61 IN | SYSTOLIC BLOOD PRESSURE: 94 MMHG | RESPIRATION RATE: 16 BRPM | TEMPERATURE: 97.8 F | DIASTOLIC BLOOD PRESSURE: 77 MMHG | OXYGEN SATURATION: 97 % | WEIGHT: 150 LBS | BODY MASS INDEX: 28.32 KG/M2

## 2023-06-24 DIAGNOSIS — R10.9 ABDOMINAL WALL PAIN: Primary | ICD-10-CM

## 2023-06-24 LAB
ALBUMIN SERPL-MCNC: 4 G/DL (ref 3.5–5.2)
ALP SERPL-CCNC: 124 U/L (ref 40–129)
ALT SERPL-CCNC: 35 U/L (ref 5–41)
ANION GAP SERPL CALCULATED.3IONS-SCNC: 11 MMOL/L (ref 9–17)
AST SERPL-CCNC: 25 U/L
BASOPHILS # BLD: 0.1 K/UL (ref 0–0.2)
BASOPHILS NFR BLD: 1 % (ref 0–2)
BILIRUB SERPL-MCNC: 0.6 MG/DL (ref 0.3–1.2)
BUN SERPL-MCNC: 16 MG/DL (ref 6–20)
BUN/CREAT SERPL: 20 (ref 9–20)
CALCIUM SERPL-MCNC: 8.7 MG/DL (ref 8.6–10.4)
CHLORIDE SERPL-SCNC: 103 MMOL/L (ref 98–107)
CO2 SERPL-SCNC: 22 MMOL/L (ref 20–31)
CREAT SERPL-MCNC: 0.81 MG/DL (ref 0.7–1.2)
DIFFERENTIAL TYPE: YES
EOSINOPHIL # BLD: 0.1 K/UL (ref 0–0.4)
EOSINOPHILS RELATIVE PERCENT: 1 % (ref 0–5)
ERYTHROCYTE [DISTWIDTH] IN BLOOD BY AUTOMATED COUNT: 12.7 % (ref 12.1–15.2)
GFR SERPL CREATININE-BSD FRML MDRD: >60 ML/MIN/1.73M2
GLUCOSE SERPL-MCNC: 136 MG/DL (ref 70–99)
HCT VFR BLD AUTO: 42 % (ref 41–53)
HGB BLD-MCNC: 14.5 G/DL (ref 13.5–17.5)
LIPASE SERPL-CCNC: 25 U/L (ref 13–60)
LYMPHOCYTES # BLD: 26 % (ref 13–44)
LYMPHOCYTES NFR BLD: 2.4 K/UL (ref 1–4.8)
MCH RBC QN AUTO: 31.9 PG (ref 26–34)
MCHC RBC AUTO-ENTMCNC: 34.5 G/DL (ref 31–37)
MCV RBC AUTO: 92.5 FL (ref 80–100)
MONOCYTES NFR BLD: 0.6 K/UL (ref 0–1)
MONOCYTES NFR BLD: 7 % (ref 5–9)
NEUTROPHILS NFR BLD: 65 % (ref 39–75)
NEUTS SEG NFR BLD: 6 K/UL (ref 2.1–6.5)
PLATELET # BLD AUTO: 186 K/UL (ref 140–450)
POTASSIUM SERPL-SCNC: 3.5 MMOL/L (ref 3.7–5.3)
PROT SERPL-MCNC: 6.6 G/DL (ref 6.4–8.3)
RBC # BLD AUTO: 4.54 M/UL (ref 4.5–5.9)
SODIUM SERPL-SCNC: 136 MMOL/L (ref 135–144)
TROPONIN I SERPL HS-MCNC: <6 NG/L (ref 0–22)
WBC OTHER # BLD: 9.2 K/UL (ref 3.5–11)

## 2023-06-24 PROCEDURE — 71046 X-RAY EXAM CHEST 2 VIEWS: CPT

## 2023-06-24 PROCEDURE — 6360000002 HC RX W HCPCS: Performed by: EMERGENCY MEDICINE

## 2023-06-24 PROCEDURE — 83690 ASSAY OF LIPASE: CPT

## 2023-06-24 PROCEDURE — 2580000003 HC RX 258: Performed by: EMERGENCY MEDICINE

## 2023-06-24 PROCEDURE — 85027 COMPLETE CBC AUTOMATED: CPT

## 2023-06-24 PROCEDURE — 93005 ELECTROCARDIOGRAM TRACING: CPT | Performed by: EMERGENCY MEDICINE

## 2023-06-24 PROCEDURE — 80053 COMPREHEN METABOLIC PANEL: CPT

## 2023-06-24 PROCEDURE — A4216 STERILE WATER/SALINE, 10 ML: HCPCS | Performed by: EMERGENCY MEDICINE

## 2023-06-24 PROCEDURE — 2500000003 HC RX 250 WO HCPCS: Performed by: EMERGENCY MEDICINE

## 2023-06-24 PROCEDURE — 84484 ASSAY OF TROPONIN QUANT: CPT

## 2023-06-24 PROCEDURE — 99285 EMERGENCY DEPT VISIT HI MDM: CPT

## 2023-06-24 PROCEDURE — 96375 TX/PRO/DX INJ NEW DRUG ADDON: CPT

## 2023-06-24 PROCEDURE — 96374 THER/PROPH/DIAG INJ IV PUSH: CPT

## 2023-06-24 RX ORDER — FAMOTIDINE 20 MG/1
20 TABLET, FILM COATED ORAL 2 TIMES DAILY
Qty: 10 TABLET | Refills: 0 | Status: SHIPPED | OUTPATIENT
Start: 2023-06-24

## 2023-06-24 RX ORDER — 0.9 % SODIUM CHLORIDE 0.9 %
1000 INTRAVENOUS SOLUTION INTRAVENOUS ONCE
Status: COMPLETED | OUTPATIENT
Start: 2023-06-24 | End: 2023-06-24

## 2023-06-24 RX ORDER — MELOXICAM 15 MG/1
15 TABLET ORAL DAILY PRN
Qty: 10 TABLET | Refills: 0 | Status: SHIPPED | OUTPATIENT
Start: 2023-06-24

## 2023-06-24 RX ORDER — KETOROLAC TROMETHAMINE 15 MG/ML
15 INJECTION, SOLUTION INTRAMUSCULAR; INTRAVENOUS ONCE
Status: COMPLETED | OUTPATIENT
Start: 2023-06-24 | End: 2023-06-24

## 2023-06-24 RX ADMIN — KETOROLAC TROMETHAMINE 15 MG: 15 INJECTION, SOLUTION INTRAMUSCULAR; INTRAVENOUS at 22:15

## 2023-06-24 RX ADMIN — SODIUM CHLORIDE 1000 ML: 9 INJECTION, SOLUTION INTRAVENOUS at 22:14

## 2023-06-24 RX ADMIN — FAMOTIDINE 20 MG: 10 INJECTION INTRAVENOUS at 22:15

## 2023-06-24 ASSESSMENT — PAIN SCALES - GENERAL
PAINLEVEL_OUTOF10: 5
PAINLEVEL_OUTOF10: 5

## 2023-06-24 ASSESSMENT — PAIN DESCRIPTION - PAIN TYPE: TYPE: ACUTE PAIN

## 2023-06-24 ASSESSMENT — LIFESTYLE VARIABLES
HOW OFTEN DO YOU HAVE A DRINK CONTAINING ALCOHOL: NEVER
HOW MANY STANDARD DRINKS CONTAINING ALCOHOL DO YOU HAVE ON A TYPICAL DAY: PATIENT DOES NOT DRINK

## 2023-06-24 ASSESSMENT — PAIN - FUNCTIONAL ASSESSMENT: PAIN_FUNCTIONAL_ASSESSMENT: 0-10

## 2023-06-24 ASSESSMENT — PAIN DESCRIPTION - LOCATION: LOCATION: ABDOMEN

## 2023-06-24 ASSESSMENT — PAIN DESCRIPTION - DESCRIPTORS: DESCRIPTORS: SHARP;STABBING

## 2023-06-24 ASSESSMENT — PAIN DESCRIPTION - FREQUENCY: FREQUENCY: INTERMITTENT

## 2023-06-24 ASSESSMENT — PAIN DESCRIPTION - ORIENTATION: ORIENTATION: RIGHT

## 2023-06-25 LAB
EKG ATRIAL RATE: 77 BPM
EKG P AXIS: 44 DEGREES
EKG P-R INTERVAL: 160 MS
EKG Q-T INTERVAL: 362 MS
EKG QRS DURATION: 78 MS
EKG QTC CALCULATION (BAZETT): 409 MS
EKG R AXIS: 52 DEGREES
EKG T AXIS: 22 DEGREES
EKG VENTRICULAR RATE: 77 BPM

## 2023-06-25 PROCEDURE — 93010 ELECTROCARDIOGRAM REPORT: CPT | Performed by: INTERNAL MEDICINE

## 2023-08-27 ENCOUNTER — APPOINTMENT (OUTPATIENT)
Dept: GENERAL RADIOLOGY | Age: 48
End: 2023-08-27
Payer: MEDICAID

## 2023-08-27 ENCOUNTER — HOSPITAL ENCOUNTER (EMERGENCY)
Age: 48
Discharge: HOME | End: 2023-08-27
Attending: FAMILY MEDICINE
Payer: MEDICAID

## 2023-08-27 VITALS
DIASTOLIC BLOOD PRESSURE: 79 MMHG | RESPIRATION RATE: 16 BRPM | HEART RATE: 90 BPM | WEIGHT: 152 LBS | BODY MASS INDEX: 27.97 KG/M2 | SYSTOLIC BLOOD PRESSURE: 131 MMHG | TEMPERATURE: 97.5 F | OXYGEN SATURATION: 98 % | HEIGHT: 62 IN

## 2023-08-27 DIAGNOSIS — R07.89 CHEST PRESSURE: Primary | ICD-10-CM

## 2023-08-27 DIAGNOSIS — R20.2 NUMBNESS AND TINGLING IN LEFT ARM: ICD-10-CM

## 2023-08-27 DIAGNOSIS — R20.0 NUMBNESS AND TINGLING IN LEFT ARM: ICD-10-CM

## 2023-08-27 LAB
ANION GAP SERPL CALCULATED.3IONS-SCNC: 12 MMOL/L (ref 9–17)
BASOPHILS # BLD: NORMAL K/UL (ref 0–0.2)
BASOPHILS NFR BLD: NORMAL % (ref 0–2)
BUN SERPL-MCNC: 12 MG/DL (ref 6–20)
BUN/CREAT SERPL: 13 (ref 9–20)
CALCIUM SERPL-MCNC: 8.9 MG/DL (ref 8.6–10.4)
CHLORIDE SERPL-SCNC: 102 MMOL/L (ref 98–107)
CO2 SERPL-SCNC: 25 MMOL/L (ref 20–31)
CREAT SERPL-MCNC: 0.9 MG/DL (ref 0.7–1.2)
EOSINOPHIL # BLD: NORMAL K/UL (ref 0–0.4)
EOSINOPHILS RELATIVE PERCENT: NORMAL % (ref 0–5)
ERYTHROCYTE [DISTWIDTH] IN BLOOD BY AUTOMATED COUNT: 12.4 % (ref 12.1–15.2)
GFR SERPL CREATININE-BSD FRML MDRD: >60 ML/MIN/1.73M2
GLUCOSE SERPL-MCNC: 130 MG/DL (ref 70–99)
HCT VFR BLD AUTO: 45.3 % (ref 41–53)
HGB BLD-MCNC: 15.6 G/DL (ref 13.5–17.5)
IMM GRANULOCYTES # BLD AUTO: NORMAL K/UL (ref 0–0.3)
IMM GRANULOCYTES NFR BLD: NORMAL %
LYMPHOCYTES NFR BLD: 2.46 K/UL (ref 1–4.8)
LYMPHOCYTES RELATIVE PERCENT: 32 % (ref 13–44)
MCH RBC QN AUTO: 31.9 PG (ref 26–34)
MCHC RBC AUTO-ENTMCNC: 34.6 G/DL (ref 31–37)
MCV RBC AUTO: 92.3 FL (ref 80–100)
MONOCYTES NFR BLD: 0.69 K/UL (ref 0–1)
MONOCYTES NFR BLD: 9 % (ref 5–9)
MORPHOLOGY: NORMAL
NEUTROPHILS NFR BLD: 59 % (ref 39–75)
NEUTS SEG NFR BLD: 4.55 K/UL (ref 2.1–6.5)
PLATELET # BLD AUTO: 202 K/UL (ref 140–450)
POTASSIUM SERPL-SCNC: 3.5 MMOL/L (ref 3.7–5.3)
RBC # BLD AUTO: 4.9 M/UL (ref 4.5–5.9)
SODIUM SERPL-SCNC: 139 MMOL/L (ref 135–144)
TROPONIN I SERPL HS-MCNC: <6 NG/L (ref 0–22)
WBC OTHER # BLD: 7.7 K/UL (ref 3.5–11)

## 2023-08-27 PROCEDURE — 93005 ELECTROCARDIOGRAM TRACING: CPT | Performed by: FAMILY MEDICINE

## 2023-08-27 PROCEDURE — 71045 X-RAY EXAM CHEST 1 VIEW: CPT

## 2023-08-27 PROCEDURE — 6370000000 HC RX 637 (ALT 250 FOR IP): Performed by: FAMILY MEDICINE

## 2023-08-27 PROCEDURE — 85025 COMPLETE CBC W/AUTO DIFF WBC: CPT

## 2023-08-27 PROCEDURE — 84484 ASSAY OF TROPONIN QUANT: CPT

## 2023-08-27 PROCEDURE — 99285 EMERGENCY DEPT VISIT HI MDM: CPT

## 2023-08-27 PROCEDURE — 80048 BASIC METABOLIC PNL TOTAL CA: CPT

## 2023-08-27 RX ORDER — ASPIRIN 81 MG/1
324 TABLET, CHEWABLE ORAL ONCE
Status: COMPLETED | OUTPATIENT
Start: 2023-08-27 | End: 2023-08-27

## 2023-08-27 RX ADMIN — ASPIRIN 81 MG CHEWABLE TABLET 324 MG: 81 TABLET CHEWABLE at 11:32

## 2023-08-27 ASSESSMENT — PAIN DESCRIPTION - ONSET: ONSET: ON-GOING

## 2023-08-27 ASSESSMENT — PAIN DESCRIPTION - LOCATION: LOCATION: ARM

## 2023-08-27 ASSESSMENT — ENCOUNTER SYMPTOMS
SHORTNESS OF BREATH: 0
CHEST TIGHTNESS: 1
NAUSEA: 0

## 2023-08-27 ASSESSMENT — LIFESTYLE VARIABLES
HOW MANY STANDARD DRINKS CONTAINING ALCOHOL DO YOU HAVE ON A TYPICAL DAY: PATIENT DOES NOT DRINK
HOW OFTEN DO YOU HAVE A DRINK CONTAINING ALCOHOL: NEVER

## 2023-08-27 ASSESSMENT — HEART SCORE: ECG: 0

## 2023-08-27 ASSESSMENT — PAIN DESCRIPTION - PAIN TYPE: TYPE: ACUTE PAIN

## 2023-08-27 ASSESSMENT — PAIN - FUNCTIONAL ASSESSMENT: PAIN_FUNCTIONAL_ASSESSMENT: ACTIVITIES ARE NOT PREVENTED

## 2023-08-27 ASSESSMENT — PAIN DESCRIPTION - DESCRIPTORS: DESCRIPTORS: NUMBNESS;TINGLING

## 2023-08-27 ASSESSMENT — PAIN DESCRIPTION - ORIENTATION: ORIENTATION: LEFT

## 2023-08-27 ASSESSMENT — PAIN DESCRIPTION - FREQUENCY: FREQUENCY: INTERMITTENT

## 2023-08-27 ASSESSMENT — PAIN SCALES - GENERAL: PAINLEVEL_OUTOF10: 4

## 2023-08-27 NOTE — ED PROVIDER NOTES
[address]      Pt Name: Vianca Mcdowell  MRN: [id number]  [address] [facility] 1975  Date of evaluation: 8/27/2023  Provider: Master Angel MD    CHIEF COMPLAINT       Chief Complaint   Patient presents with    Arm Pain     Numbness and tingling to left arm x 3 weeks. Chest pressure x 4 days. HISTORY OF PRESENT ILLNESS      Dagoberto Gracia is a 50 y.o. male who presents to the emergency department via private vehicle, patient complaining of ~3 weeks intermittent episodes of numbness and tingling in his left upper extremity, describing from his hand to his mid upper arm area, not extending into the neck or upper shoulder, not related to laying on the arm or strenuous events. Patient also states for the past 3 to 4 days has had some intermittent episodes of chest pressure, without any shortness of breath nausea or diaphoresis. Patient denies ever having a stress test in the past, denies being a smoker, denies any history of high blood pressure cholesterol or diabetes. REVIEW OF SYSTEMS       Review of Systems   Constitutional:  Negative for diaphoresis. Respiratory:  Positive for chest tightness. Negative for shortness of breath. Gastrointestinal:  Negative for nausea. Neurological:  Positive for numbness. All other systems reviewed and are negative. PAST MEDICAL HISTORY     Past Medical History:   Diagnosis Date    Biliary colic 28/85/5370    Kidney stone          SURGICAL HISTORY       Past Surgical History:   Procedure Laterality Date    APPENDECTOMY  1991    in [address]  6/11/2013    LAPAROSCOPIC         CURRENT MEDICATIONS       Previous Medications    No medications on file       ALLERGIES       Patient has no known allergies. FAMILY HISTORY       History reviewed. No pertinent family history.        SOCIAL HISTORY       Social History     Tobacco Use    Smoking status: Never    Smokeless tobacco: Never   Vaping

## 2023-08-28 LAB
EKG ATRIAL RATE: 79 BPM
EKG P AXIS: 60 DEGREES
EKG P-R INTERVAL: 156 MS
EKG Q-T INTERVAL: 348 MS
EKG QRS DURATION: 78 MS
EKG QTC CALCULATION (BAZETT): 399 MS
EKG R AXIS: 67 DEGREES
EKG T AXIS: 47 DEGREES
EKG VENTRICULAR RATE: 79 BPM

## 2023-08-28 PROCEDURE — 93010 ELECTROCARDIOGRAM REPORT: CPT | Performed by: INTERNAL MEDICINE

## 2024-05-09 ENCOUNTER — HOSPITAL ENCOUNTER (EMERGENCY)
Age: 49
Discharge: ANOTHER HOSPITAL | End: 2024-05-10
Attending: EMERGENCY MEDICINE
Payer: MEDICAID

## 2024-05-09 ENCOUNTER — APPOINTMENT (OUTPATIENT)
Dept: CT IMAGING | Age: 49
End: 2024-05-09
Payer: MEDICAID

## 2024-05-09 VITALS
TEMPERATURE: 97.7 F | SYSTOLIC BLOOD PRESSURE: 108 MMHG | DIASTOLIC BLOOD PRESSURE: 64 MMHG | BODY MASS INDEX: 27.6 KG/M2 | OXYGEN SATURATION: 97 % | HEIGHT: 62 IN | HEART RATE: 72 BPM | WEIGHT: 150 LBS | RESPIRATION RATE: 19 BRPM

## 2024-05-09 DIAGNOSIS — R10.11 RIGHT UPPER QUADRANT ABDOMINAL PAIN: Primary | ICD-10-CM

## 2024-05-09 LAB
ALBUMIN SERPL-MCNC: 4.4 G/DL (ref 3.5–5.2)
ALP SERPL-CCNC: 191 U/L (ref 40–129)
ALT SERPL-CCNC: 174 U/L (ref 5–41)
ANION GAP SERPL CALCULATED.3IONS-SCNC: 13 MMOL/L (ref 9–17)
AST SERPL-CCNC: 285 U/L
BASOPHILS # BLD: 0.03 K/UL (ref 0–0.2)
BASOPHILS NFR BLD: 0 % (ref 0–2)
BILIRUB SERPL-MCNC: 1.8 MG/DL (ref 0.3–1.2)
BUN SERPL-MCNC: 16 MG/DL (ref 6–20)
BUN/CREAT SERPL: 23 (ref 9–20)
CALCIUM SERPL-MCNC: 9.1 MG/DL (ref 8.6–10.4)
CHLORIDE SERPL-SCNC: 101 MMOL/L (ref 98–107)
CO2 SERPL-SCNC: 25 MMOL/L (ref 20–31)
CREAT SERPL-MCNC: 0.7 MG/DL (ref 0.7–1.2)
EOSINOPHIL # BLD: 0.03 K/UL (ref 0–0.4)
EOSINOPHILS RELATIVE PERCENT: 0 % (ref 0–5)
ERYTHROCYTE [DISTWIDTH] IN BLOOD BY AUTOMATED COUNT: 11.6 % (ref 12.1–15.2)
GFR, ESTIMATED: >90 ML/MIN/1.73M2
GLUCOSE SERPL-MCNC: 142 MG/DL (ref 70–99)
HCT VFR BLD AUTO: 43.1 % (ref 41–53)
HGB BLD-MCNC: 15.4 G/DL (ref 13.5–17.5)
IMM GRANULOCYTES # BLD AUTO: 0.01 K/UL (ref 0–0.3)
IMM GRANULOCYTES NFR BLD: 0 % (ref 0–5)
LIPASE SERPL-CCNC: 26 U/L (ref 13–60)
LYMPHOCYTES NFR BLD: 2.19 K/UL (ref 1–4.8)
LYMPHOCYTES RELATIVE PERCENT: 19 % (ref 13–44)
MCH RBC QN AUTO: 31.2 PG (ref 26–34)
MCHC RBC AUTO-ENTMCNC: 35.7 G/DL (ref 31–37)
MCV RBC AUTO: 87.4 FL (ref 80–100)
MONOCYTES NFR BLD: 0.84 K/UL (ref 0–1)
MONOCYTES NFR BLD: 7 % (ref 5–9)
NEUTROPHILS NFR BLD: 74 % (ref 39–75)
NEUTS SEG NFR BLD: 8.18 K/UL (ref 2.1–6.5)
PLATELET # BLD AUTO: 187 K/UL (ref 140–450)
PMV BLD AUTO: 9.8 FL (ref 6–12)
POTASSIUM SERPL-SCNC: 3.5 MMOL/L (ref 3.7–5.3)
PROT SERPL-MCNC: 7.3 G/DL (ref 6.4–8.3)
RBC # BLD AUTO: 4.93 M/UL (ref 4.5–5.9)
SODIUM SERPL-SCNC: 139 MMOL/L (ref 135–144)
WBC OTHER # BLD: 11.3 K/UL (ref 3.5–11)

## 2024-05-09 PROCEDURE — 85025 COMPLETE CBC W/AUTO DIFF WBC: CPT

## 2024-05-09 PROCEDURE — 74177 CT ABD & PELVIS W/CONTRAST: CPT

## 2024-05-09 PROCEDURE — 80053 COMPREHEN METABOLIC PANEL: CPT

## 2024-05-09 PROCEDURE — 6370000000 HC RX 637 (ALT 250 FOR IP): Performed by: EMERGENCY MEDICINE

## 2024-05-09 PROCEDURE — 2580000003 HC RX 258: Performed by: EMERGENCY MEDICINE

## 2024-05-09 PROCEDURE — A4216 STERILE WATER/SALINE, 10 ML: HCPCS | Performed by: EMERGENCY MEDICINE

## 2024-05-09 PROCEDURE — 96374 THER/PROPH/DIAG INJ IV PUSH: CPT

## 2024-05-09 PROCEDURE — 99285 EMERGENCY DEPT VISIT HI MDM: CPT

## 2024-05-09 PROCEDURE — 83690 ASSAY OF LIPASE: CPT

## 2024-05-09 PROCEDURE — 96375 TX/PRO/DX INJ NEW DRUG ADDON: CPT

## 2024-05-09 PROCEDURE — 6360000002 HC RX W HCPCS: Performed by: EMERGENCY MEDICINE

## 2024-05-09 PROCEDURE — 2500000003 HC RX 250 WO HCPCS: Performed by: EMERGENCY MEDICINE

## 2024-05-09 PROCEDURE — 6360000004 HC RX CONTRAST MEDICATION: Performed by: EMERGENCY MEDICINE

## 2024-05-09 RX ORDER — OMEPRAZOLE 40 MG/1
40 CAPSULE, DELAYED RELEASE ORAL DAILY
Status: ON HOLD | COMMUNITY
Start: 2019-09-09

## 2024-05-09 RX ORDER — ONDANSETRON 2 MG/ML
4 INJECTION INTRAMUSCULAR; INTRAVENOUS
Status: DISCONTINUED | OUTPATIENT
Start: 2024-05-09 | End: 2024-05-10 | Stop reason: HOSPADM

## 2024-05-09 RX ADMIN — IOPAMIDOL 75 ML: 755 INJECTION, SOLUTION INTRAVENOUS at 19:54

## 2024-05-09 RX ADMIN — FAMOTIDINE 20 MG: 10 INJECTION, SOLUTION INTRAVENOUS at 18:56

## 2024-05-09 RX ADMIN — ALUMINUM HYDROXIDE, MAGNESIUM HYDROXIDE, AND SIMETHICONE: 200; 200; 20 SUSPENSION ORAL at 18:52

## 2024-05-09 RX ADMIN — ONDANSETRON 4 MG: 2 INJECTION INTRAMUSCULAR; INTRAVENOUS at 19:04

## 2024-05-09 ASSESSMENT — PAIN DESCRIPTION - PAIN TYPE: TYPE: ACUTE PAIN

## 2024-05-09 ASSESSMENT — PAIN - FUNCTIONAL ASSESSMENT: PAIN_FUNCTIONAL_ASSESSMENT: 0-10

## 2024-05-09 ASSESSMENT — PAIN DESCRIPTION - FREQUENCY: FREQUENCY: INTERMITTENT

## 2024-05-09 ASSESSMENT — PAIN DESCRIPTION - LOCATION: LOCATION: ABDOMEN

## 2024-05-09 ASSESSMENT — PAIN DESCRIPTION - ORIENTATION: ORIENTATION: UPPER

## 2024-05-09 ASSESSMENT — PAIN SCALES - GENERAL: PAINLEVEL_OUTOF10: 9

## 2024-05-09 ASSESSMENT — PAIN DESCRIPTION - DESCRIPTORS: DESCRIPTORS: ACHING;CRAMPING

## 2024-05-09 NOTE — ED NOTES
Patient vomited after drinking the GI cocktail. Zofran administered. Patient stated he felt a little bit better after vomiting.

## 2024-05-09 NOTE — ED PROVIDER NOTES
[facility] ED  eMERGENCY dEPARTMENT eNCOUnter      Pt Name: Dagoberto Monroy  MRN: [id number]  Birthdate 1975  Date of evaluation: 5/9/2024  Provider: Rad Elizabeth MD    CHIEF COMPLAINT       Chief Complaint   Patient presents with    Abdominal Pain     Abdominal pain to mid upper abdomen that radiates to right side of back. Denies chest pain or SOB. Denies dysuria.      Patient is a 48-year-old male who presents to the emergency department complaining of mid epigastric abdominal pain.  Patient has a history of cholecystectomy and appendectomy and has had previous history of gastritis versus ulcer.  Patient presents today with pain that has been going on for just 1 to 2 hours.  He denies any fever or chills.  He denies any chest pain or shortness of breath        Nursing Notes were reviewed.    REVIEW OF SYSTEMS    (2-9 systems for level 4, 10 or more for level 5)     Review of Systems    Except as noted above the remainder of the review of systems was reviewed and negative.       PAST MEDICAL HISTORY     Past Medical History:   Diagnosis Date    Biliary colic 05/31/2013    Kidney stone          SURGICAL HISTORY       Past Surgical History:   Procedure Laterality Date    APPENDECTOMY  1991    in [place]    CHOLECYSTECTOMY  6/11/2013    LAPAROSCOPIC         ALLERGIES     Patient has no known allergies.    FAMILY HISTORY     History reviewed. No pertinent family history.       SOCIAL HISTORY       Social History     Socioeconomic History    Marital status:      Spouse name: None    Number of children: None    Years of education: None    Highest education level: None   Tobacco Use    Smoking status: Never    Smokeless tobacco: Never   Vaping Use    Vaping Use: Never used   Substance and Sexual Activity    Alcohol use: No    Drug use: No           PHYSICAL EXAM    (up to 7 for level 4, 8 ormore for level 5)     ED Triage Vitals [05/09/24 1837]   BP Temp Temp Source Pulse Respirations SpO2 Height

## 2024-05-10 ENCOUNTER — APPOINTMENT (OUTPATIENT)
Dept: ULTRASOUND IMAGING | Age: 49
DRG: 284 | End: 2024-05-10
Attending: INTERNAL MEDICINE
Payer: MEDICAID

## 2024-05-10 ENCOUNTER — HOSPITAL ENCOUNTER (INPATIENT)
Age: 49
LOS: 4 days | Discharge: HOME | DRG: 284 | End: 2024-05-14
Attending: INTERNAL MEDICINE | Admitting: STUDENT IN AN ORGANIZED HEALTH CARE EDUCATION/TRAINING PROGRAM
Payer: MEDICAID

## 2024-05-10 ENCOUNTER — APPOINTMENT (OUTPATIENT)
Dept: MRI IMAGING | Age: 49
DRG: 284 | End: 2024-05-10
Attending: INTERNAL MEDICINE
Payer: MEDICAID

## 2024-05-10 DIAGNOSIS — R79.89 ABNORMAL LFTS: Primary | ICD-10-CM

## 2024-05-10 DIAGNOSIS — K83.1 COMMON BILE DUCT (CBD) OBSTRUCTION: ICD-10-CM

## 2024-05-10 PROBLEM — R74.8 ELEVATED LIVER ENZYMES: Status: ACTIVE | Noted: 2024-05-10

## 2024-05-10 PROBLEM — R10.13 ABDOMINAL PAIN, EPIGASTRIC: Status: ACTIVE | Noted: 2024-05-10

## 2024-05-10 PROBLEM — R10.9 ABDOMINAL PAIN: Status: ACTIVE | Noted: 2024-05-10

## 2024-05-10 LAB
A1AT SERPL-MCNC: 107 MG/DL (ref 90–200)
AFP SERPL-MCNC: 3 UG/L
ALBUMIN SERPL-MCNC: 4 G/DL (ref 3.5–5.2)
ALBUMIN SERPL-MCNC: 4.1 G/DL (ref 3.5–5.2)
ALBUMIN/GLOB SERPL: 1 {RATIO} (ref 1–2.5)
ALBUMIN/GLOB SERPL: 2 {RATIO} (ref 1–2.5)
ALP SERPL-CCNC: 179 U/L (ref 40–129)
ALP SERPL-CCNC: 179 U/L (ref 40–129)
ALT SERPL-CCNC: 183 U/L (ref 10–50)
ALT SERPL-CCNC: 219 U/L (ref 10–50)
ANION GAP SERPL CALCULATED.3IONS-SCNC: 10 MMOL/L (ref 9–16)
ANION GAP SERPL CALCULATED.3IONS-SCNC: 12 MMOL/L (ref 9–16)
AST SERPL-CCNC: 119 U/L (ref 10–50)
AST SERPL-CCNC: 85 U/L (ref 10–50)
BILIRUB SERPL-MCNC: 1.6 MG/DL (ref 0–1.2)
BILIRUB SERPL-MCNC: 1.6 MG/DL (ref 0–1.2)
BUN SERPL-MCNC: 13 MG/DL (ref 6–20)
BUN SERPL-MCNC: 15 MG/DL (ref 6–20)
CA-I BLD-SCNC: 1.13 MMOL/L (ref 1.13–1.33)
CALCIUM SERPL-MCNC: 8.7 MG/DL (ref 8.6–10.4)
CALCIUM SERPL-MCNC: 8.8 MG/DL (ref 8.6–10.4)
CERULOPLASMIN SERPL-MCNC: 21 MG/DL (ref 15–30)
CHLORIDE SERPL-SCNC: 107 MMOL/L (ref 98–107)
CHLORIDE SERPL-SCNC: 107 MMOL/L (ref 98–107)
CO2 SERPL-SCNC: 22 MMOL/L (ref 20–31)
CO2 SERPL-SCNC: 22 MMOL/L (ref 20–31)
CREAT SERPL-MCNC: 0.8 MG/DL (ref 0.7–1.2)
CREAT SERPL-MCNC: 1 MG/DL (ref 0.7–1.2)
FERRITIN SERPL-MCNC: 436 NG/ML (ref 30–400)
GFR, ESTIMATED: >90 ML/MIN/1.73M2
GFR, ESTIMATED: >90 ML/MIN/1.73M2
GLUCOSE SERPL-MCNC: 80 MG/DL (ref 74–99)
GLUCOSE SERPL-MCNC: 80 MG/DL (ref 74–99)
HAV IGM SERPL QL IA: NONREACTIVE
HAV IGM SERPL QL IA: NONREACTIVE
HBV CORE IGM SERPL QL IA: NONREACTIVE
HBV CORE IGM SERPL QL IA: NONREACTIVE
HBV SURFACE AG SERPL QL IA: NONREACTIVE
HBV SURFACE AG SERPL QL IA: NONREACTIVE
HCV AB SERPL QL IA: NONREACTIVE
HCV AB SERPL QL IA: NONREACTIVE
INR PPP: 1.1
IRON SATN MFR SERPL: ABNORMAL % (ref 20–55)
IRON SERPL-MCNC: 211 UG/DL (ref 61–157)
LIPASE SERPL-CCNC: 19 U/L (ref 13–60)
MAGNESIUM SERPL-MCNC: 2.2 MG/DL (ref 1.6–2.6)
PHOSPHATE SERPL-MCNC: 3.1 MG/DL (ref 2.5–4.5)
POTASSIUM SERPL-SCNC: 3.8 MMOL/L (ref 3.7–5.3)
POTASSIUM SERPL-SCNC: 4 MMOL/L (ref 3.7–5.3)
PROT SERPL-MCNC: 6.7 G/DL (ref 6.6–8.7)
PROT SERPL-MCNC: 6.8 G/DL (ref 6.6–8.7)
PROTHROMBIN TIME: 14 SEC (ref 11.7–14.9)
SODIUM SERPL-SCNC: 139 MMOL/L (ref 136–145)
SODIUM SERPL-SCNC: 141 MMOL/L (ref 136–145)
TIBC SERPL-MCNC: ABNORMAL UG/DL (ref 250–450)
TRIGL SERPL-MCNC: 82 MG/DL
UNSATURATED IRON BINDING CAPACITY: <17 UG/DL (ref 112–347)

## 2024-05-10 PROCEDURE — 97161 PT EVAL LOW COMPLEX 20 MIN: CPT

## 2024-05-10 PROCEDURE — 82105 ALPHA-FETOPROTEIN SERUM: CPT

## 2024-05-10 PROCEDURE — 76705 ECHO EXAM OF ABDOMEN: CPT

## 2024-05-10 PROCEDURE — 6360000002 HC RX W HCPCS: Performed by: NURSE PRACTITIONER

## 2024-05-10 PROCEDURE — 84478 ASSAY OF TRIGLYCERIDES: CPT

## 2024-05-10 PROCEDURE — 83550 IRON BINDING TEST: CPT

## 2024-05-10 PROCEDURE — 82390 ASSAY OF CERULOPLASMIN: CPT

## 2024-05-10 PROCEDURE — 97530 THERAPEUTIC ACTIVITIES: CPT

## 2024-05-10 PROCEDURE — 2580000003 HC RX 258

## 2024-05-10 PROCEDURE — 86225 DNA ANTIBODY NATIVE: CPT

## 2024-05-10 PROCEDURE — 2580000003 HC RX 258: Performed by: STUDENT IN AN ORGANIZED HEALTH CARE EDUCATION/TRAINING PROGRAM

## 2024-05-10 PROCEDURE — 82728 ASSAY OF FERRITIN: CPT

## 2024-05-10 PROCEDURE — 6360000002 HC RX W HCPCS

## 2024-05-10 PROCEDURE — 97535 SELF CARE MNGMENT TRAINING: CPT

## 2024-05-10 PROCEDURE — C9113 INJ PANTOPRAZOLE SODIUM, VIA: HCPCS

## 2024-05-10 PROCEDURE — 82103 ALPHA-1-ANTITRYPSIN TOTAL: CPT

## 2024-05-10 PROCEDURE — 97165 OT EVAL LOW COMPLEX 30 MIN: CPT

## 2024-05-10 PROCEDURE — 80074 ACUTE HEPATITIS PANEL: CPT

## 2024-05-10 PROCEDURE — 83516 IMMUNOASSAY NONANTIBODY: CPT

## 2024-05-10 PROCEDURE — 84100 ASSAY OF PHOSPHORUS: CPT

## 2024-05-10 PROCEDURE — 86038 ANTINUCLEAR ANTIBODIES: CPT

## 2024-05-10 PROCEDURE — 83690 ASSAY OF LIPASE: CPT

## 2024-05-10 PROCEDURE — 74183 MRI ABD W/O CNTR FLWD CNTR: CPT

## 2024-05-10 PROCEDURE — C9113 INJ PANTOPRAZOLE SODIUM, VIA: HCPCS | Performed by: STUDENT IN AN ORGANIZED HEALTH CARE EDUCATION/TRAINING PROGRAM

## 2024-05-10 PROCEDURE — A9576 INJ PROHANCE MULTIPACK: HCPCS | Performed by: NURSE PRACTITIONER

## 2024-05-10 PROCEDURE — 83540 ASSAY OF IRON: CPT

## 2024-05-10 PROCEDURE — 85610 PROTHROMBIN TIME: CPT

## 2024-05-10 PROCEDURE — 2580000003 HC RX 258: Performed by: NURSE PRACTITIONER

## 2024-05-10 PROCEDURE — 82330 ASSAY OF CALCIUM: CPT

## 2024-05-10 PROCEDURE — 83735 ASSAY OF MAGNESIUM: CPT

## 2024-05-10 PROCEDURE — 6360000002 HC RX W HCPCS: Performed by: STUDENT IN AN ORGANIZED HEALTH CARE EDUCATION/TRAINING PROGRAM

## 2024-05-10 PROCEDURE — 86376 MICROSOMAL ANTIBODY EACH: CPT

## 2024-05-10 PROCEDURE — 99222 1ST HOSP IP/OBS MODERATE 55: CPT | Performed by: STUDENT IN AN ORGANIZED HEALTH CARE EDUCATION/TRAINING PROGRAM

## 2024-05-10 PROCEDURE — 80053 COMPREHEN METABOLIC PANEL: CPT

## 2024-05-10 PROCEDURE — A4216 STERILE WATER/SALINE, 10 ML: HCPCS

## 2024-05-10 PROCEDURE — 1200000000 HC SEMI PRIVATE

## 2024-05-10 PROCEDURE — 6360000004 HC RX CONTRAST MEDICATION: Performed by: NURSE PRACTITIONER

## 2024-05-10 PROCEDURE — 36415 COLL VENOUS BLD VENIPUNCTURE: CPT

## 2024-05-10 PROCEDURE — 99255 IP/OBS CONSLTJ NEW/EST HI 80: CPT | Performed by: INTERNAL MEDICINE

## 2024-05-10 RX ORDER — ENOXAPARIN SODIUM 100 MG/ML
40 INJECTION SUBCUTANEOUS DAILY
Status: DISCONTINUED | OUTPATIENT
Start: 2024-05-10 | End: 2024-05-14 | Stop reason: HOSPADM

## 2024-05-10 RX ORDER — SODIUM CHLORIDE 0.9 % (FLUSH) 0.9 %
5-40 SYRINGE (ML) INJECTION PRN
Status: DISCONTINUED | OUTPATIENT
Start: 2024-05-10 | End: 2024-05-14 | Stop reason: HOSPADM

## 2024-05-10 RX ORDER — ONDANSETRON 4 MG/1
4 TABLET, ORALLY DISINTEGRATING ORAL EVERY 8 HOURS PRN
Status: DISCONTINUED | OUTPATIENT
Start: 2024-05-10 | End: 2024-05-14 | Stop reason: HOSPADM

## 2024-05-10 RX ORDER — SODIUM CHLORIDE 9 MG/ML
INJECTION, SOLUTION INTRAVENOUS CONTINUOUS
Status: DISCONTINUED | OUTPATIENT
Start: 2024-05-10 | End: 2024-05-11

## 2024-05-10 RX ORDER — SODIUM CHLORIDE 0.9 % (FLUSH) 0.9 %
10 SYRINGE (ML) INJECTION PRN
Status: DISCONTINUED | OUTPATIENT
Start: 2024-05-10 | End: 2024-05-14 | Stop reason: HOSPADM

## 2024-05-10 RX ORDER — MAGNESIUM SULFATE IN WATER 40 MG/ML
2000 INJECTION, SOLUTION INTRAVENOUS PRN
Status: DISCONTINUED | OUTPATIENT
Start: 2024-05-10 | End: 2024-05-14 | Stop reason: HOSPADM

## 2024-05-10 RX ORDER — POTASSIUM CHLORIDE 7.45 MG/ML
10 INJECTION INTRAVENOUS PRN
Status: DISCONTINUED | OUTPATIENT
Start: 2024-05-10 | End: 2024-05-14 | Stop reason: HOSPADM

## 2024-05-10 RX ORDER — POTASSIUM CHLORIDE 29.8 MG/ML
20 INJECTION INTRAVENOUS PRN
Status: DISCONTINUED | OUTPATIENT
Start: 2024-05-10 | End: 2024-05-14 | Stop reason: HOSPADM

## 2024-05-10 RX ORDER — SODIUM CHLORIDE 0.9 % (FLUSH) 0.9 %
5-40 SYRINGE (ML) INJECTION EVERY 12 HOURS SCHEDULED
Status: DISCONTINUED | OUTPATIENT
Start: 2024-05-10 | End: 2024-05-14 | Stop reason: HOSPADM

## 2024-05-10 RX ORDER — SODIUM CHLORIDE 9 MG/ML
INJECTION, SOLUTION INTRAVENOUS PRN
Status: DISCONTINUED | OUTPATIENT
Start: 2024-05-10 | End: 2024-05-14 | Stop reason: HOSPADM

## 2024-05-10 RX ORDER — 0.9 % SODIUM CHLORIDE 0.9 %
30 INTRAVENOUS SOLUTION INTRAVENOUS ONCE
Status: DISCONTINUED | OUTPATIENT
Start: 2024-05-10 | End: 2024-05-14 | Stop reason: HOSPADM

## 2024-05-10 RX ORDER — ONDANSETRON 2 MG/ML
4 INJECTION INTRAMUSCULAR; INTRAVENOUS EVERY 6 HOURS PRN
Status: DISCONTINUED | OUTPATIENT
Start: 2024-05-10 | End: 2024-05-14 | Stop reason: HOSPADM

## 2024-05-10 RX ADMIN — PIPERACILLIN AND TAZOBACTAM 3375 MG: 3; .375 INJECTION, POWDER, LYOPHILIZED, FOR SOLUTION INTRAVENOUS at 17:39

## 2024-05-10 RX ADMIN — SODIUM CHLORIDE, PRESERVATIVE FREE 40 MG: 5 INJECTION INTRAVENOUS at 08:50

## 2024-05-10 RX ADMIN — PIPERACILLIN AND TAZOBACTAM 4500 MG: 4; .5 INJECTION, POWDER, LYOPHILIZED, FOR SOLUTION INTRAVENOUS at 12:59

## 2024-05-10 RX ADMIN — Medication 30 ML: at 16:33

## 2024-05-10 RX ADMIN — SODIUM CHLORIDE: 9 INJECTION, SOLUTION INTRAVENOUS at 03:42

## 2024-05-10 RX ADMIN — SODIUM CHLORIDE, PRESERVATIVE FREE 40 MG: 5 INJECTION INTRAVENOUS at 20:58

## 2024-05-10 RX ADMIN — SODIUM CHLORIDE, PRESERVATIVE FREE 10 ML: 5 INJECTION INTRAVENOUS at 16:32

## 2024-05-10 RX ADMIN — SODIUM CHLORIDE, PRESERVATIVE FREE 10 ML: 5 INJECTION INTRAVENOUS at 08:53

## 2024-05-10 RX ADMIN — GADOTERIDOL 12 ML: 279.3 INJECTION, SOLUTION INTRAVENOUS at 16:32

## 2024-05-10 ASSESSMENT — PAIN SCALES - GENERAL: PAINLEVEL_OUTOF10: 0

## 2024-05-10 NOTE — H&P
[facility]  Office: [phone]  Leon Cabrera DO, Cleve Matson DO, Brock Ward DO, Philip Lewis DO, Kimberly Pacheco MD, Concha Chacko MD, Janes Sagastume MD, Samina Dixon MD,  Andrew House MD, Navin Garza MD, Román Liu DO, Kathy Urbina MD,  Jessica Mclaughlin DO, Philip Mercedes MD, Kulwant Resendiz MD, Swapnil Cabrera DO, Lisa Funez MD, Marcellus Vo MD, Miguel Cuevas DO, Elli Terrell MD, Meche Hernandez MD, Delilah Grewal MD, Sveta Reeves MD,  Huber Melo DO, Gonzales Storm MD,  Shirley Waterhouse, CNP,  Janelle Amaya, CNP, Polly Mcarthur, CNP, Jamison Chow, CNP,  María Murphy, [facility], Libra Tolbert, CNP, Sherry Briones, CNP, Janel Nathan, CNP, Alissa Schmitt, CNP, Joana Hernandez, CNP, James Stevenson PA-C, Brigitte Roman, CNS, Rhina Christian, CNP, Addis Arce, CNP         [facility]   IN-PATIENT SERVICE   [facility]    HISTORY AND PHYSICAL EXAMINATION            Date:   5/10/2024  Patient name:  Dagoberto Monroy  Date of admission:  5/10/2024  3:24 AM  MRN:   [id number]  Account:  [id number]  YOB: 1975  PCP:    No primary care provider on file.  Room:   [address] [place] [address]  Code Status:    Full Code    Chief Complaint:     Abdominal pain  History Obtained From:     patient    History of Present Illness:     Dagoberto Monroy is a 48 y.o.  /  male who presents with abdominal pain and outside facility transferred for further evaluation   and is admitted to the hospital for the management of Abdominal pain.    48-year-old male with past medical history of cholecystectomy, appendectomy, gastritis/PUD presented to [place] ED with mid upper abdominal pain radiating towards the right side.  Patient been started around 4 PM, resolved after few hours.  Denies any fever chills shortness of breath or chest pain.  Patient mentioned that he has been taking Prilosec every day for his gastritis, he ran out of his medication few

## 2024-05-10 NOTE — PLAN OF CARE
Problem: Discharge Planning  Goal: Discharge to home or other facility with appropriate resources  Outcome: Progressing     Problem: Gastrointestinal - Adult  Goal: Minimal or absence of nausea and vomiting  Outcome: Progressing  Goal: Maintains or returns to baseline bowel function  Outcome: Progressing  Goal: Maintains adequate nutritional intake  Outcome: Progressing     Problem: Gastrointestinal - Adult  Goal: Maintains or returns to baseline bowel function  Outcome: Progressing     Problem: Gastrointestinal - Adult  Goal: Maintains adequate nutritional intake  Outcome: Progressing     Problem: Infection - Adult  Goal: Absence of infection at discharge  Outcome: Progressing  Goal: Absence of infection during hospitalization  Outcome: Progressing  Goal: Absence of fever/infection during anticipated neutropenic period  Outcome: Progressing

## 2024-05-10 NOTE — CARE COORDINATION
Case Management Assessment  Initial Evaluation    Date/Time of Evaluation: 5/10/2024 11:52 AM  Assessment Completed by: JOSIE GERBER RN    If patient is discharged prior to next notation, then this note serves as note for discharge by case management.    Patient Name: Dagoberto Monroy                   YOB: 1975  Diagnosis: Abdominal pain [R10.9]  Elevated liver enzymes [R74.8]                   Date / Time: 5/10/2024  3:24 AM    Patient Admission Status: Inpatient   Readmission Risk (Low < 19, Mod (19-27), High > 27): Readmission Risk Score: 4.4    Current PCP: No primary care provider on file.  PCP verified by CM?  (none)    Chart Reviewed: Yes      History Provided by: Patient  Patient Orientation: Alert and Oriented    Patient Cognition: Alert    Hospitalization in the last 30 days (Readmission):  No    If yes, Readmission Assessment in CM Navigator will be completed.    Advance Directives:      Code Status: Full Code   Patient's Primary Decision Maker is: Legal Next of Kin    Primary Decision Maker: Dara Monroy - Spouse - [phone]    Discharge Planning:    Patient lives with: Spouse/Significant Other Type of Home: House  Primary Care Giver: Self  Patient Support Systems include: Spouse/Significant Other   Current Financial resources: Medicaid  Current community resources: None  Current services prior to admission: None            Current DME:              Type of Home Care services:  None    ADLS  Prior functional level: Independent in ADLs/IADLs  Current functional level: Independent in ADLs/IADLs    PT AM-PAC: 24 /24  OT AM-PAC:   /24    Family can provide assistance at DC: Other (comment) (independent)  Would you like Case Management to discuss the discharge plan with any other family members/significant others, and if so, who? Yes (wife)  Plans to Return to Present Housing: Yes  Other Identified Issues/Barriers to RETURNING to current housing: none  Potential Assistance needed at

## 2024-05-10 NOTE — PLAN OF CARE
Problem: Discharge Planning  Goal: Discharge to home or other facility with appropriate resources  Outcome: Progressing     Problem: Gastrointestinal - Adult  Goal: Minimal or absence of nausea and vomiting  Outcome: Progressing  Goal: Maintains or returns to baseline bowel function  Outcome: Progressing  Goal: Maintains adequate nutritional intake  Outcome: Progressing     Problem: Infection - Adult  Goal: Absence of infection at discharge  Outcome: Progressing  Goal: Absence of infection during hospitalization  Outcome: Progressing  Goal: Absence of fever/infection during anticipated neutropenic period  Outcome: Progressing     Problem: Metabolic/Fluid and Electrolytes - Adult  Goal: Electrolytes maintained within normal limits  Outcome: Progressing  Goal: Hemodynamic stability and optimal renal function maintained  Outcome: Progressing  Goal: Glucose maintained within prescribed range  Outcome: Progressing     Problem: Hematologic - Adult  Goal: Maintains hematologic stability  Outcome: Progressing     Problem: Safety - Adult  Goal: Free from fall injury  Outcome: Progressing

## 2024-05-10 NOTE — CONSULTS
[facility]    GASTROENTEROLOGY CONSULT    Patient:   Dagoberto Monroy   :    1975   Facility:   [facility]   Date:    5/10/2024  Admission Dx:  Abdominal pain [R10.9]  Elevated liver enzymes [R74.8]  Requesting physician: Samina Dixon MD  Reason for consult:  Elevated liver enzymes   CC : \"Abdominal pain\"    SUBJECTIVE     HISTORY OF PRESENT ILLNESS  This is a 48 y.o. male with past medical history of GERD, acute cholecystitis s/p cholecystectomy, appendicectomy, gastritis was admitted 5/10/2024 with Abdominal pain, Elevated liver enzymes. We have been asked to see the patient in consultation by Samina Dixon MD for elevated liver enzymes    Patient initially presented to [facility] ED with upper abdominal pain yesterday evening, his abdominal pain improved after getting treatment at the [facility] ED went back to home after his pain is controlled later he came back to [facility] again since he started having the abdominal pain with radiation to right upper quadrant this time he got GI cocktail and he vomited after receiving that so received Zofran, he was sent to [place] for further evaluation and management.    According to the patient, he started having sudden upper abdominal pain when he was trying to move things around at work.  Usually he takes Prilosec for his GERD symptoms, sometimes he misses the doses whenever he runs out of the medication this time he missed 1 dose of Prilosec since then he has been having heartburn and the next today he started having upper abdominal pain which is kind of similar to the pain when he had cholecystitis attack.  He denies any nausea, vomiting, diarrhea, fevers, chills, chest pain, shortness of breath, lightheadedness, urinary symptoms.  He denies any family history of liver disease/cancers.  He denies any usage of over-the-counter medication except for Mucinex, Prilosec and does

## 2024-05-10 NOTE — FLOWSHEET NOTE
Pt admitted from [place] ER by EMS to room  322. Pt oriented to room and call light use. No family present.  Assessment completed and vital signs obtained. Plan of care reviewed with pt. No acute distress noted. Pt was able to walk from stretcher to bed without difficulty. Pt currently denies any pain. Admission history completed. No current PCP.

## 2024-05-11 PROBLEM — R79.89 ABNORMAL LFTS: Status: ACTIVE | Noted: 2024-05-11

## 2024-05-11 LAB
ALBUMIN SERPL-MCNC: 4.4 G/DL (ref 3.5–5.2)
ALBUMIN/GLOB SERPL: 2 {RATIO} (ref 1–2.5)
ALP SERPL-CCNC: 176 U/L (ref 40–129)
ALT SERPL-CCNC: 189 U/L (ref 10–50)
ANION GAP SERPL CALCULATED.3IONS-SCNC: 13 MMOL/L (ref 9–16)
AST SERPL-CCNC: 88 U/L (ref 10–50)
BACTERIA URNS QL MICRO: NORMAL
BILIRUB SERPL-MCNC: 1.7 MG/DL (ref 0–1.2)
BILIRUB UR QL STRIP: NEGATIVE
BUN SERPL-MCNC: 13 MG/DL (ref 6–20)
CALCIUM SERPL-MCNC: 9 MG/DL (ref 8.6–10.4)
CASTS #/AREA URNS LPF: NORMAL /LPF (ref 0–8)
CHLORIDE SERPL-SCNC: 106 MMOL/L (ref 98–107)
CLARITY UR: CLEAR
CO2 SERPL-SCNC: 21 MMOL/L (ref 20–31)
COLOR UR: YELLOW
CREAT SERPL-MCNC: 0.9 MG/DL (ref 0.7–1.2)
EPI CELLS #/AREA URNS HPF: NORMAL /HPF (ref 0–5)
GFR, ESTIMATED: >90 ML/MIN/1.73M2
GLUCOSE BLD-MCNC: 110 MG/DL (ref 75–110)
GLUCOSE BLD-MCNC: 115 MG/DL (ref 75–110)
GLUCOSE BLD-MCNC: 118 MG/DL (ref 75–110)
GLUCOSE SERPL-MCNC: 49 MG/DL (ref 74–99)
GLUCOSE UR STRIP-MCNC: NEGATIVE MG/DL
HGB UR QL STRIP.AUTO: ABNORMAL
KETONES UR STRIP-MCNC: NEGATIVE MG/DL
LEUKOCYTE ESTERASE UR QL STRIP: NEGATIVE
NITRITE UR QL STRIP: NEGATIVE
PH UR STRIP: 6.5 [PH] (ref 5–8)
POTASSIUM SERPL-SCNC: 4 MMOL/L (ref 3.7–5.3)
PROT SERPL-MCNC: 6.8 G/DL (ref 6.6–8.7)
PROT UR STRIP-MCNC: NEGATIVE MG/DL
RBC #/AREA URNS HPF: NORMAL /HPF (ref 0–4)
SODIUM SERPL-SCNC: 140 MMOL/L (ref 136–145)
SP GR UR STRIP: 1.02 (ref 1–1.03)
UROBILINOGEN UR STRIP-ACNC: NORMAL EU/DL (ref 0–1)
WBC #/AREA URNS HPF: NORMAL /HPF (ref 0–5)

## 2024-05-11 PROCEDURE — 81001 URINALYSIS AUTO W/SCOPE: CPT

## 2024-05-11 PROCEDURE — 36415 COLL VENOUS BLD VENIPUNCTURE: CPT

## 2024-05-11 PROCEDURE — 80053 COMPREHEN METABOLIC PANEL: CPT

## 2024-05-11 PROCEDURE — 1200000000 HC SEMI PRIVATE

## 2024-05-11 PROCEDURE — 99232 SBSQ HOSP IP/OBS MODERATE 35: CPT | Performed by: STUDENT IN AN ORGANIZED HEALTH CARE EDUCATION/TRAINING PROGRAM

## 2024-05-11 PROCEDURE — A4216 STERILE WATER/SALINE, 10 ML: HCPCS

## 2024-05-11 PROCEDURE — 6360000002 HC RX W HCPCS: Performed by: NURSE PRACTITIONER

## 2024-05-11 PROCEDURE — 2580000003 HC RX 258: Performed by: STUDENT IN AN ORGANIZED HEALTH CARE EDUCATION/TRAINING PROGRAM

## 2024-05-11 PROCEDURE — 99232 SBSQ HOSP IP/OBS MODERATE 35: CPT | Performed by: INTERNAL MEDICINE

## 2024-05-11 PROCEDURE — 2580000003 HC RX 258

## 2024-05-11 PROCEDURE — 82947 ASSAY GLUCOSE BLOOD QUANT: CPT

## 2024-05-11 PROCEDURE — 2580000003 HC RX 258: Performed by: NURSE PRACTITIONER

## 2024-05-11 PROCEDURE — C9113 INJ PANTOPRAZOLE SODIUM, VIA: HCPCS

## 2024-05-11 PROCEDURE — 6360000002 HC RX W HCPCS

## 2024-05-11 RX ORDER — DEXTROSE MONOHYDRATE 100 MG/ML
INJECTION, SOLUTION INTRAVENOUS CONTINUOUS PRN
Status: DISCONTINUED | OUTPATIENT
Start: 2024-05-11 | End: 2024-05-14 | Stop reason: HOSPADM

## 2024-05-11 RX ORDER — GLUCAGON 1 MG/ML
1 KIT INJECTION PRN
Status: DISCONTINUED | OUTPATIENT
Start: 2024-05-11 | End: 2024-05-14 | Stop reason: HOSPADM

## 2024-05-11 RX ORDER — DEXTROSE AND SODIUM CHLORIDE 5; .45 G/100ML; G/100ML
INJECTION, SOLUTION INTRAVENOUS CONTINUOUS
Status: DISCONTINUED | OUTPATIENT
Start: 2024-05-11 | End: 2024-05-13

## 2024-05-11 RX ADMIN — DEXTROSE AND SODIUM CHLORIDE: 5; 450 INJECTION, SOLUTION INTRAVENOUS at 13:40

## 2024-05-11 RX ADMIN — SODIUM CHLORIDE, PRESERVATIVE FREE 10 ML: 5 INJECTION INTRAVENOUS at 09:04

## 2024-05-11 RX ADMIN — PIPERACILLIN AND TAZOBACTAM 3375 MG: 3; .375 INJECTION, POWDER, LYOPHILIZED, FOR SOLUTION INTRAVENOUS at 18:50

## 2024-05-11 RX ADMIN — PIPERACILLIN AND TAZOBACTAM 3375 MG: 3; .375 INJECTION, POWDER, LYOPHILIZED, FOR SOLUTION INTRAVENOUS at 09:05

## 2024-05-11 RX ADMIN — SODIUM CHLORIDE, PRESERVATIVE FREE 40 MG: 5 INJECTION INTRAVENOUS at 09:03

## 2024-05-11 RX ADMIN — PIPERACILLIN AND TAZOBACTAM 3375 MG: 3; .375 INJECTION, POWDER, LYOPHILIZED, FOR SOLUTION INTRAVENOUS at 00:35

## 2024-05-11 RX ADMIN — SODIUM CHLORIDE, PRESERVATIVE FREE 40 MG: 5 INJECTION INTRAVENOUS at 21:20

## 2024-05-11 ASSESSMENT — PAIN SCALES - GENERAL
PAINLEVEL_OUTOF10: 0
PAINLEVEL_OUTOF10: 0

## 2024-05-11 NOTE — PLAN OF CARE
Problem: Infection - Adult  Goal: Absence of infection during hospitalization  5/11/2024 0216 by Alesha Arana RN  Outcome: Progressing  5/10/2024 1927 by Minal Walker RN  Outcome: Progressing     Problem: Discharge Planning  Goal: Discharge to home or other facility with appropriate resources  5/11/2024 0216 by Alesha Arana RN  Outcome: Progressing  5/10/2024 1927 by Minal Walker, RN  Outcome: Progressing

## 2024-05-12 LAB
ALBUMIN SERPL-MCNC: 4 G/DL (ref 3.5–5.2)
ALBUMIN/GLOB SERPL: 2 {RATIO} (ref 1–2.5)
ALP SERPL-CCNC: 142 U/L (ref 40–129)
ALT SERPL-CCNC: 102 U/L (ref 10–50)
ANION GAP SERPL CALCULATED.3IONS-SCNC: 9 MMOL/L (ref 9–16)
AST SERPL-CCNC: 32 U/L (ref 10–50)
BILIRUB SERPL-MCNC: 0.8 MG/DL (ref 0–1.2)
BUN SERPL-MCNC: 13 MG/DL (ref 6–20)
CALCIUM SERPL-MCNC: 8.7 MG/DL (ref 8.6–10.4)
CHLORIDE SERPL-SCNC: 106 MMOL/L (ref 98–107)
CO2 SERPL-SCNC: 23 MMOL/L (ref 20–31)
CREAT SERPL-MCNC: 1 MG/DL (ref 0.7–1.2)
GFR, ESTIMATED: >90 ML/MIN/1.73M2
GLUCOSE BLD-MCNC: 110 MG/DL (ref 75–110)
GLUCOSE BLD-MCNC: 117 MG/DL (ref 75–110)
GLUCOSE BLD-MCNC: 134 MG/DL (ref 75–110)
GLUCOSE BLD-MCNC: 137 MG/DL (ref 75–110)
GLUCOSE SERPL-MCNC: 116 MG/DL (ref 74–99)
LKM AB TITR SER IF: NORMAL {TITER}
POTASSIUM SERPL-SCNC: 3.7 MMOL/L (ref 3.7–5.3)
PROT SERPL-MCNC: 6.3 G/DL (ref 6.6–8.7)
SMOOTH MUSCLE ANTIBODY: 5 UNITS (ref 0–19)
SODIUM SERPL-SCNC: 138 MMOL/L (ref 136–145)

## 2024-05-12 PROCEDURE — 2580000003 HC RX 258: Performed by: NURSE PRACTITIONER

## 2024-05-12 PROCEDURE — 36415 COLL VENOUS BLD VENIPUNCTURE: CPT

## 2024-05-12 PROCEDURE — C9113 INJ PANTOPRAZOLE SODIUM, VIA: HCPCS

## 2024-05-12 PROCEDURE — 82947 ASSAY GLUCOSE BLOOD QUANT: CPT

## 2024-05-12 PROCEDURE — 6360000002 HC RX W HCPCS: Performed by: NURSE PRACTITIONER

## 2024-05-12 PROCEDURE — 80053 COMPREHEN METABOLIC PANEL: CPT

## 2024-05-12 PROCEDURE — 6360000002 HC RX W HCPCS

## 2024-05-12 PROCEDURE — APPSS45 APP SPLIT SHARED TIME 31-45 MINUTES: Performed by: INTERNAL MEDICINE

## 2024-05-12 PROCEDURE — A4216 STERILE WATER/SALINE, 10 ML: HCPCS

## 2024-05-12 PROCEDURE — 99231 SBSQ HOSP IP/OBS SF/LOW 25: CPT | Performed by: INTERNAL MEDICINE

## 2024-05-12 PROCEDURE — 2580000003 HC RX 258

## 2024-05-12 PROCEDURE — 99232 SBSQ HOSP IP/OBS MODERATE 35: CPT | Performed by: STUDENT IN AN ORGANIZED HEALTH CARE EDUCATION/TRAINING PROGRAM

## 2024-05-12 PROCEDURE — 1200000000 HC SEMI PRIVATE

## 2024-05-12 RX ORDER — PROCHLORPERAZINE EDISYLATE 5 MG/ML
10 INJECTION INTRAMUSCULAR; INTRAVENOUS ONCE
Status: COMPLETED | OUTPATIENT
Start: 2024-05-12 | End: 2024-05-12

## 2024-05-12 RX ADMIN — HYDROMORPHONE HYDROCHLORIDE 0.5 MG: 1 INJECTION, SOLUTION INTRAMUSCULAR; INTRAVENOUS; SUBCUTANEOUS at 20:13

## 2024-05-12 RX ADMIN — SODIUM CHLORIDE, PRESERVATIVE FREE 40 MG: 5 INJECTION INTRAVENOUS at 08:17

## 2024-05-12 RX ADMIN — PIPERACILLIN AND TAZOBACTAM 3375 MG: 3; .375 INJECTION, POWDER, LYOPHILIZED, FOR SOLUTION INTRAVENOUS at 08:20

## 2024-05-12 RX ADMIN — SODIUM CHLORIDE, PRESERVATIVE FREE 40 MG: 5 INJECTION INTRAVENOUS at 20:12

## 2024-05-12 RX ADMIN — PIPERACILLIN AND TAZOBACTAM 3375 MG: 3; .375 INJECTION, POWDER, LYOPHILIZED, FOR SOLUTION INTRAVENOUS at 18:01

## 2024-05-12 RX ADMIN — PIPERACILLIN AND TAZOBACTAM 3375 MG: 3; .375 INJECTION, POWDER, LYOPHILIZED, FOR SOLUTION INTRAVENOUS at 01:57

## 2024-05-12 RX ADMIN — ONDANSETRON 4 MG: 2 INJECTION INTRAMUSCULAR; INTRAVENOUS at 20:20

## 2024-05-12 RX ADMIN — SODIUM CHLORIDE, PRESERVATIVE FREE 10 ML: 5 INJECTION INTRAVENOUS at 08:20

## 2024-05-12 RX ADMIN — PROCHLORPERAZINE EDISYLATE 10 MG: 5 INJECTION INTRAMUSCULAR; INTRAVENOUS at 22:22

## 2024-05-12 ASSESSMENT — PAIN SCALES - GENERAL
PAINLEVEL_OUTOF10: 4
PAINLEVEL_OUTOF10: 8
PAINLEVEL_OUTOF10: 0
PAINLEVEL_OUTOF10: 0

## 2024-05-12 ASSESSMENT — PAIN DESCRIPTION - LOCATION: LOCATION: ABDOMEN;BACK

## 2024-05-12 ASSESSMENT — PAIN DESCRIPTION - DESCRIPTORS: DESCRIPTORS: ACHING;DISCOMFORT

## 2024-05-12 NOTE — PLAN OF CARE
Problem: Discharge Planning  Goal: Discharge to home or other facility with appropriate resources  Outcome: Progressing     Problem: Gastrointestinal - Adult  Goal: Minimal or absence of nausea and vomiting  Outcome: Progressing  Goal: Maintains or returns to baseline bowel function  Outcome: Progressing  Goal: Maintains adequate nutritional intake  Outcome: Progressing     Problem: Infection - Adult  Goal: Absence of infection at discharge  Outcome: Progressing  Goal: Absence of infection during hospitalization  Outcome: Progressing  Goal: Absence of fever/infection during anticipated neutropenic period  Outcome: Progressing     Problem: Metabolic/Fluid and Electrolytes - Adult  Goal: Electrolytes maintained within normal limits  Outcome: Progressing  Goal: Hemodynamic stability and optimal renal function maintained  Outcome: Progressing  Goal: Glucose maintained within prescribed range  Outcome: Progressing     Problem: Hematologic - Adult  Goal: Maintains hematologic stability  Outcome: Progressing     Problem: Safety - Adult  Goal: Free from fall injury  Outcome: Progressing     Problem: Pain  Goal: Verbalizes/displays adequate comfort level or baseline comfort level  Outcome: Progressing

## 2024-05-13 ENCOUNTER — ANESTHESIA (OUTPATIENT)
Dept: OPERATING ROOM | Age: 49
DRG: 284 | End: 2024-05-13
Payer: MEDICAID

## 2024-05-13 ENCOUNTER — ANESTHESIA EVENT (OUTPATIENT)
Dept: OPERATING ROOM | Age: 49
DRG: 284 | End: 2024-05-13
Payer: MEDICAID

## 2024-05-13 ENCOUNTER — APPOINTMENT (OUTPATIENT)
Dept: ULTRASOUND IMAGING | Age: 49
DRG: 284 | End: 2024-05-13
Attending: INTERNAL MEDICINE
Payer: MEDICAID

## 2024-05-13 ENCOUNTER — APPOINTMENT (OUTPATIENT)
Dept: GENERAL RADIOLOGY | Age: 49
DRG: 284 | End: 2024-05-13
Attending: INTERNAL MEDICINE
Payer: MEDICAID

## 2024-05-13 LAB
ALBUMIN SERPL-MCNC: 4 G/DL (ref 3.5–5.2)
ALBUMIN/GLOB SERPL: 2 {RATIO} (ref 1–2.5)
ALP SERPL-CCNC: 195 U/L (ref 40–129)
ALT SERPL-CCNC: 304 U/L (ref 10–50)
ANION GAP SERPL CALCULATED.3IONS-SCNC: 8 MMOL/L (ref 9–16)
AST SERPL-CCNC: 257 U/L (ref 10–50)
BILIRUB SERPL-MCNC: 1.1 MG/DL (ref 0–1.2)
BUN SERPL-MCNC: 13 MG/DL (ref 6–20)
CALCIUM SERPL-MCNC: 8.7 MG/DL (ref 8.6–10.4)
CHLORIDE SERPL-SCNC: 107 MMOL/L (ref 98–107)
CO2 SERPL-SCNC: 23 MMOL/L (ref 20–31)
CREAT SERPL-MCNC: 0.8 MG/DL (ref 0.7–1.2)
GFR, ESTIMATED: >90 ML/MIN/1.73M2
GLUCOSE BLD-MCNC: 118 MG/DL (ref 75–110)
GLUCOSE BLD-MCNC: 140 MG/DL (ref 75–110)
GLUCOSE BLD-MCNC: 193 MG/DL (ref 75–110)
GLUCOSE SERPL-MCNC: 136 MG/DL (ref 74–99)
POTASSIUM SERPL-SCNC: 3.9 MMOL/L (ref 3.7–5.3)
PROT SERPL-MCNC: 6.2 G/DL (ref 6.6–8.7)
SODIUM SERPL-SCNC: 138 MMOL/L (ref 136–145)

## 2024-05-13 PROCEDURE — 2709999900 HC NON-CHARGEABLE SUPPLY: Performed by: INTERNAL MEDICINE

## 2024-05-13 PROCEDURE — 83690 ASSAY OF LIPASE: CPT

## 2024-05-13 PROCEDURE — 99232 SBSQ HOSP IP/OBS MODERATE 35: CPT | Performed by: STUDENT IN AN ORGANIZED HEALTH CARE EDUCATION/TRAINING PROGRAM

## 2024-05-13 PROCEDURE — 3609014900 HC ERCP W/SPHINCTEROTOMY &/OR PAPILLOTOMY: Performed by: INTERNAL MEDICINE

## 2024-05-13 PROCEDURE — C1769 GUIDE WIRE: HCPCS | Performed by: INTERNAL MEDICINE

## 2024-05-13 PROCEDURE — 3609012400 HC EGD TRANSORAL BIOPSY SINGLE/MULTIPLE: Performed by: INTERNAL MEDICINE

## 2024-05-13 PROCEDURE — C9113 INJ PANTOPRAZOLE SODIUM, VIA: HCPCS

## 2024-05-13 PROCEDURE — 76975 GI ENDOSCOPIC ULTRASOUND: CPT | Performed by: INTERNAL MEDICINE

## 2024-05-13 PROCEDURE — 2580000003 HC RX 258: Performed by: INTERNAL MEDICINE

## 2024-05-13 PROCEDURE — 7100000001 HC PACU RECOVERY - ADDTL 15 MIN: Performed by: INTERNAL MEDICINE

## 2024-05-13 PROCEDURE — A4216 STERILE WATER/SALINE, 10 ML: HCPCS

## 2024-05-13 PROCEDURE — 2580000003 HC RX 258: Performed by: STUDENT IN AN ORGANIZED HEALTH CARE EDUCATION/TRAINING PROGRAM

## 2024-05-13 PROCEDURE — 2580000003 HC RX 258

## 2024-05-13 PROCEDURE — 2500000003 HC RX 250 WO HCPCS

## 2024-05-13 PROCEDURE — 88305 TISSUE EXAM BY PATHOLOGIST: CPT

## 2024-05-13 PROCEDURE — 3609018500 HC EGD US SCOPE W/ADJACENT STRUCTURES: Performed by: INTERNAL MEDICINE

## 2024-05-13 PROCEDURE — 2580000003 HC RX 258: Performed by: NURSE PRACTITIONER

## 2024-05-13 PROCEDURE — 0FC98ZZ EXTIRPATION OF MATTER FROM COMMON BILE DUCT, VIA NATURAL OR ARTIFICIAL OPENING ENDOSCOPIC: ICD-10-PCS | Performed by: INTERNAL MEDICINE

## 2024-05-13 PROCEDURE — 3609015200 HC ERCP REMOVE CALCULI/DEBRIS BILIARY/PANCREAS DUCT: Performed by: INTERNAL MEDICINE

## 2024-05-13 PROCEDURE — 6360000002 HC RX W HCPCS: Performed by: NURSE PRACTITIONER

## 2024-05-13 PROCEDURE — 6370000000 HC RX 637 (ALT 250 FOR IP): Performed by: INTERNAL MEDICINE

## 2024-05-13 PROCEDURE — 0DB68ZX EXCISION OF STOMACH, VIA NATURAL OR ARTIFICIAL OPENING ENDOSCOPIC, DIAGNOSTIC: ICD-10-PCS | Performed by: INTERNAL MEDICINE

## 2024-05-13 PROCEDURE — 3700000000 HC ANESTHESIA ATTENDED CARE: Performed by: INTERNAL MEDICINE

## 2024-05-13 PROCEDURE — 7100000000 HC PACU RECOVERY - FIRST 15 MIN: Performed by: INTERNAL MEDICINE

## 2024-05-13 PROCEDURE — BF101ZZ FLUOROSCOPY OF BILE DUCTS USING LOW OSMOLAR CONTRAST: ICD-10-PCS | Performed by: INTERNAL MEDICINE

## 2024-05-13 PROCEDURE — 1200000000 HC SEMI PRIVATE

## 2024-05-13 PROCEDURE — 6360000002 HC RX W HCPCS: Performed by: INTERNAL MEDICINE

## 2024-05-13 PROCEDURE — C9113 INJ PANTOPRAZOLE SODIUM, VIA: HCPCS | Performed by: INTERNAL MEDICINE

## 2024-05-13 PROCEDURE — 6360000002 HC RX W HCPCS

## 2024-05-13 PROCEDURE — 36415 COLL VENOUS BLD VENIPUNCTURE: CPT

## 2024-05-13 PROCEDURE — 80053 COMPREHEN METABOLIC PANEL: CPT

## 2024-05-13 PROCEDURE — A4216 STERILE WATER/SALINE, 10 ML: HCPCS | Performed by: INTERNAL MEDICINE

## 2024-05-13 PROCEDURE — 82947 ASSAY GLUCOSE BLOOD QUANT: CPT

## 2024-05-13 PROCEDURE — 6360000002 HC RX W HCPCS: Performed by: STUDENT IN AN ORGANIZED HEALTH CARE EDUCATION/TRAINING PROGRAM

## 2024-05-13 PROCEDURE — 3700000001 HC ADD 15 MINUTES (ANESTHESIA): Performed by: INTERNAL MEDICINE

## 2024-05-13 PROCEDURE — 2720000010 HC SURG SUPPLY STERILE: Performed by: INTERNAL MEDICINE

## 2024-05-13 RX ORDER — SODIUM CHLORIDE 0.9 % (FLUSH) 0.9 %
5-40 SYRINGE (ML) INJECTION PRN
Status: DISCONTINUED | OUTPATIENT
Start: 2024-05-13 | End: 2024-05-13 | Stop reason: HOSPADM

## 2024-05-13 RX ORDER — SODIUM CHLORIDE, SODIUM LACTATE, POTASSIUM CHLORIDE, CALCIUM CHLORIDE 600; 310; 30; 20 MG/100ML; MG/100ML; MG/100ML; MG/100ML
INJECTION, SOLUTION INTRAVENOUS CONTINUOUS
Status: ACTIVE | OUTPATIENT
Start: 2024-05-13 | End: 2024-05-13

## 2024-05-13 RX ORDER — SUCCINYLCHOLINE/SOD CL,ISO/PF 100 MG/5ML
SYRINGE (ML) INTRAVENOUS PRN
Status: DISCONTINUED | OUTPATIENT
Start: 2024-05-13 | End: 2024-05-13 | Stop reason: SDUPTHER

## 2024-05-13 RX ORDER — ROCURONIUM BROMIDE 10 MG/ML
INJECTION, SOLUTION INTRAVENOUS PRN
Status: DISCONTINUED | OUTPATIENT
Start: 2024-05-13 | End: 2024-05-13 | Stop reason: SDUPTHER

## 2024-05-13 RX ORDER — SODIUM CHLORIDE 0.9 % (FLUSH) 0.9 %
5-40 SYRINGE (ML) INJECTION EVERY 12 HOURS SCHEDULED
Status: DISCONTINUED | OUTPATIENT
Start: 2024-05-13 | End: 2024-05-13 | Stop reason: HOSPADM

## 2024-05-13 RX ORDER — NALOXONE HYDROCHLORIDE 0.4 MG/ML
INJECTION, SOLUTION INTRAMUSCULAR; INTRAVENOUS; SUBCUTANEOUS PRN
Status: CANCELLED | OUTPATIENT
Start: 2024-05-13

## 2024-05-13 RX ORDER — PHENYLEPHRINE HCL IN 0.9% NACL 1 MG/10 ML
SYRINGE (ML) INTRAVENOUS PRN
Status: DISCONTINUED | OUTPATIENT
Start: 2024-05-13 | End: 2024-05-13 | Stop reason: SDUPTHER

## 2024-05-13 RX ORDER — SODIUM CHLORIDE 9 MG/ML
INJECTION, SOLUTION INTRAVENOUS PRN
Status: CANCELLED | OUTPATIENT
Start: 2024-05-13

## 2024-05-13 RX ORDER — DEXAMETHASONE SODIUM PHOSPHATE 10 MG/ML
INJECTION INTRAMUSCULAR; INTRAVENOUS PRN
Status: DISCONTINUED | OUTPATIENT
Start: 2024-05-13 | End: 2024-05-13 | Stop reason: SDUPTHER

## 2024-05-13 RX ORDER — PROPOFOL 10 MG/ML
INJECTION, EMULSION INTRAVENOUS PRN
Status: DISCONTINUED | OUTPATIENT
Start: 2024-05-13 | End: 2024-05-13 | Stop reason: SDUPTHER

## 2024-05-13 RX ORDER — SODIUM CHLORIDE, SODIUM LACTATE, POTASSIUM CHLORIDE, CALCIUM CHLORIDE 600; 310; 30; 20 MG/100ML; MG/100ML; MG/100ML; MG/100ML
INJECTION, SOLUTION INTRAVENOUS CONTINUOUS PRN
Status: DISCONTINUED | OUTPATIENT
Start: 2024-05-13 | End: 2024-05-13 | Stop reason: SDUPTHER

## 2024-05-13 RX ORDER — FENTANYL CITRATE 50 UG/ML
INJECTION, SOLUTION INTRAMUSCULAR; INTRAVENOUS PRN
Status: DISCONTINUED | OUTPATIENT
Start: 2024-05-13 | End: 2024-05-13 | Stop reason: SDUPTHER

## 2024-05-13 RX ORDER — SODIUM CHLORIDE 9 MG/ML
INJECTION, SOLUTION INTRAVENOUS PRN
Status: DISCONTINUED | OUTPATIENT
Start: 2024-05-13 | End: 2024-05-13 | Stop reason: HOSPADM

## 2024-05-13 RX ORDER — LIDOCAINE HYDROCHLORIDE 10 MG/ML
1 INJECTION, SOLUTION INFILTRATION; PERINEURAL
Status: DISCONTINUED | OUTPATIENT
Start: 2024-05-13 | End: 2024-05-13 | Stop reason: HOSPADM

## 2024-05-13 RX ORDER — FENTANYL CITRATE 50 UG/ML
25 INJECTION, SOLUTION INTRAMUSCULAR; INTRAVENOUS
Status: DISCONTINUED | OUTPATIENT
Start: 2024-05-13 | End: 2024-05-14 | Stop reason: HOSPADM

## 2024-05-13 RX ORDER — ONDANSETRON 2 MG/ML
4 INJECTION INTRAMUSCULAR; INTRAVENOUS
Status: CANCELLED | OUTPATIENT
Start: 2024-05-13 | End: 2024-05-14

## 2024-05-13 RX ORDER — SODIUM CHLORIDE 0.9 % (FLUSH) 0.9 %
5-40 SYRINGE (ML) INJECTION EVERY 12 HOURS SCHEDULED
Status: CANCELLED | OUTPATIENT
Start: 2024-05-13

## 2024-05-13 RX ORDER — SODIUM CHLORIDE 0.9 % (FLUSH) 0.9 %
5-40 SYRINGE (ML) INJECTION PRN
Status: CANCELLED | OUTPATIENT
Start: 2024-05-13

## 2024-05-13 RX ORDER — MIDAZOLAM HYDROCHLORIDE 2 MG/2ML
1 INJECTION, SOLUTION INTRAMUSCULAR; INTRAVENOUS EVERY 10 MIN PRN
Status: DISCONTINUED | OUTPATIENT
Start: 2024-05-13 | End: 2024-05-13 | Stop reason: HOSPADM

## 2024-05-13 RX ORDER — ONDANSETRON 2 MG/ML
INJECTION INTRAMUSCULAR; INTRAVENOUS PRN
Status: DISCONTINUED | OUTPATIENT
Start: 2024-05-13 | End: 2024-05-13 | Stop reason: SDUPTHER

## 2024-05-13 RX ORDER — LIDOCAINE HYDROCHLORIDE 10 MG/ML
INJECTION, SOLUTION EPIDURAL; INFILTRATION; INTRACAUDAL; PERINEURAL PRN
Status: DISCONTINUED | OUTPATIENT
Start: 2024-05-13 | End: 2024-05-13 | Stop reason: SDUPTHER

## 2024-05-13 RX ORDER — INDOMETHACIN 50 MG/1
SUPPOSITORY RECTAL PRN
Status: DISCONTINUED | OUTPATIENT
Start: 2024-05-13 | End: 2024-05-13 | Stop reason: ALTCHOICE

## 2024-05-13 RX ADMIN — Medication 200 MG: at 11:08

## 2024-05-13 RX ADMIN — FENTANYL CITRATE 25 MCG: 50 INJECTION, SOLUTION INTRAMUSCULAR; INTRAVENOUS at 13:51

## 2024-05-13 RX ADMIN — SODIUM CHLORIDE, POTASSIUM CHLORIDE, SODIUM LACTATE AND CALCIUM CHLORIDE: 600; 310; 30; 20 INJECTION, SOLUTION INTRAVENOUS at 17:01

## 2024-05-13 RX ADMIN — FENTANYL CITRATE 50 MCG: 50 INJECTION, SOLUTION INTRAMUSCULAR; INTRAVENOUS at 11:51

## 2024-05-13 RX ADMIN — PROPOFOL 100 MCG/KG/MIN: 10 INJECTION, EMULSION INTRAVENOUS at 10:49

## 2024-05-13 RX ADMIN — SODIUM CHLORIDE, PRESERVATIVE FREE 10 ML: 5 INJECTION INTRAVENOUS at 09:30

## 2024-05-13 RX ADMIN — Medication 100 MCG: at 11:06

## 2024-05-13 RX ADMIN — PIPERACILLIN AND TAZOBACTAM 3375 MG: 3; .375 INJECTION, POWDER, LYOPHILIZED, FOR SOLUTION INTRAVENOUS at 22:11

## 2024-05-13 RX ADMIN — LIDOCAINE HYDROCHLORIDE 50 MG: 10 INJECTION, SOLUTION EPIDURAL; INFILTRATION; INTRACAUDAL; PERINEURAL at 10:49

## 2024-05-13 RX ADMIN — Medication 100 MCG: at 11:20

## 2024-05-13 RX ADMIN — DEXAMETHASONE SODIUM PHOSPHATE 10 MG: 10 INJECTION INTRAMUSCULAR; INTRAVENOUS at 11:08

## 2024-05-13 RX ADMIN — SODIUM CHLORIDE: 9 INJECTION, SOLUTION INTRAVENOUS at 22:09

## 2024-05-13 RX ADMIN — SODIUM CHLORIDE, POTASSIUM CHLORIDE, SODIUM LACTATE AND CALCIUM CHLORIDE: 600; 310; 30; 20 INJECTION, SOLUTION INTRAVENOUS at 12:45

## 2024-05-13 RX ADMIN — PROPOFOL 40 MG: 10 INJECTION, EMULSION INTRAVENOUS at 10:55

## 2024-05-13 RX ADMIN — FENTANYL CITRATE 50 MCG: 50 INJECTION, SOLUTION INTRAMUSCULAR; INTRAVENOUS at 11:14

## 2024-05-13 RX ADMIN — PIPERACILLIN AND TAZOBACTAM 3375 MG: 3; .375 INJECTION, POWDER, LYOPHILIZED, FOR SOLUTION INTRAVENOUS at 01:36

## 2024-05-13 RX ADMIN — ROCURONIUM BROMIDE 10 MG: 10 INJECTION INTRAVENOUS at 11:08

## 2024-05-13 RX ADMIN — SODIUM CHLORIDE, PRESERVATIVE FREE 10 ML: 5 INJECTION INTRAVENOUS at 20:40

## 2024-05-13 RX ADMIN — PIPERACILLIN AND TAZOBACTAM 3375 MG: 3; .375 INJECTION, POWDER, LYOPHILIZED, FOR SOLUTION INTRAVENOUS at 13:31

## 2024-05-13 RX ADMIN — SODIUM CHLORIDE, PRESERVATIVE FREE 40 MG: 5 INJECTION INTRAVENOUS at 20:39

## 2024-05-13 RX ADMIN — PROPOFOL 60 MG: 10 INJECTION, EMULSION INTRAVENOUS at 10:50

## 2024-05-13 RX ADMIN — SODIUM CHLORIDE, PRESERVATIVE FREE 40 MG: 5 INJECTION INTRAVENOUS at 09:29

## 2024-05-13 RX ADMIN — PROPOFOL 100 MG: 10 INJECTION, EMULSION INTRAVENOUS at 11:08

## 2024-05-13 RX ADMIN — ONDANSETRON 4 MG: 2 INJECTION INTRAMUSCULAR; INTRAVENOUS at 11:08

## 2024-05-13 RX ADMIN — SODIUM CHLORIDE, POTASSIUM CHLORIDE, SODIUM LACTATE AND CALCIUM CHLORIDE: 600; 310; 30; 20 INJECTION, SOLUTION INTRAVENOUS at 10:24

## 2024-05-13 ASSESSMENT — PAIN SCALES - GENERAL
PAINLEVEL_OUTOF10: 5
PAINLEVEL_OUTOF10: 0
PAINLEVEL_OUTOF10: 5
PAINLEVEL_OUTOF10: 5
PAINLEVEL_OUTOF10: 7

## 2024-05-13 ASSESSMENT — PAIN DESCRIPTION - LOCATION
LOCATION: ABDOMEN

## 2024-05-13 ASSESSMENT — PAIN DESCRIPTION - DESCRIPTORS
DESCRIPTORS: BURNING

## 2024-05-13 ASSESSMENT — PAIN - FUNCTIONAL ASSESSMENT: PAIN_FUNCTIONAL_ASSESSMENT: 0-10

## 2024-05-13 NOTE — PLAN OF CARE
Problem: Discharge Planning  Goal: Discharge to home or other facility with appropriate resources  Outcome: Progressing     Problem: Gastrointestinal - Adult  Goal: Minimal or absence of nausea and vomiting  Outcome: Progressing  Goal: Maintains or returns to baseline bowel function  Outcome: Progressing  Goal: Maintains adequate nutritional intake  Outcome: Progressing     Problem: Infection - Adult  Goal: Absence of infection at discharge  Outcome: Progressing  Goal: Absence of infection during hospitalization  Outcome: Progressing  Goal: Absence of fever/infection during anticipated neutropenic period  Outcome: Progressing     Problem: Metabolic/Fluid and Electrolytes - Adult  Goal: Electrolytes maintained within normal limits  Outcome: Progressing  Goal: Hemodynamic stability and optimal renal function maintained  Outcome: Progressing  Goal: Glucose maintained within prescribed range  Outcome: Progressing     Problem: Hematologic - Adult  Goal: Maintains hematologic stability  Outcome: Progressing     Problem: Pain  Goal: Verbalizes/displays adequate comfort level or baseline comfort level  Outcome: Progressing     Problem: Safety - Adult  Goal: Free from fall injury  Outcome: Progressing

## 2024-05-13 NOTE — ANESTHESIA POSTPROCEDURE EVALUATION
Department of Anesthesiology  Postprocedure Note    Patient: Dagoberto Monroy  MRN: [id number]  YOB: 1975  Date of evaluation: 5/13/2024    Procedure Summary       Date: 05/13/24 Room / Location: [address]    Anesthesia Start: 1044 Anesthesia Stop: 1155    Procedures:       ENDOSCOPIC ULTRASOUND      ENDOSCOPIC RETROGRADE CHOLANGIOPANCREATOGRAPHY STONE REMOVAL      ESOPHAGOGASTRODUODENOSCOPY BIOPSY      ENDOSCOPIC RETROGRADE CHOLANGIOPANCREATOGRAPHY SPHINCTER/PAPILLOTOMY Diagnosis:       Common bile duct (CBD) obstruction      (Common bile duct (CBD) obstruction [K83.1])    Surgeons: Siva Jensen MD Responsible Provider: Anthony Hoskins MD    Anesthesia Type: general ASA Status: 2            Anesthesia Type: No value filed.    Cameron Phase I: Cameron Score: 9    Cameron Phase II:      Anesthesia Post Evaluation    Patient location during evaluation: PACU  Patient participation: complete - patient participated  Level of consciousness: awake and alert  Pain score: 0  Airway patency: patent  Nausea & Vomiting: no vomiting and no nausea  Cardiovascular status: hemodynamically stable  Respiratory status: acceptable  Hydration status: stable  Pain management: adequate    No notable events documented.

## 2024-05-13 NOTE — ANESTHESIA PRE PROCEDURE
Department of Anesthesiology  Preprocedure Note       Name:  Dagoberto Monroy   Age:  48 y.o.  :  1975                                          MRN:  [id number]         Date:  2024      Surgeon: Surgeon(s):  Siva Jensen MD    Procedure: Procedure(s):  ENDOSCOPIC ULTRASOUND  ENDOSCOPIC RETROGRADE CHOLANGIOPANCREATOGRAPHY    Medications prior to admission:   Prior to Admission medications    Medication Sig Start Date End Date Taking? Authorizing Provider   omeprazole (PRILOSEC) 40 MG delayed release capsule Take 1 capsule by mouth daily 19   Provider, MD Abhijeet       Current medications:    Current Facility-Administered Medications   Medication Dose Route Frequency Provider Last Rate Last Admin   • [MAR Hold] glucose chewable tablet 16 g  4 tablet Oral PRN Philip Mercedes MD       • [MAR Hold] dextrose bolus 10% 125 mL  125 mL IntraVENous PRN Philip Mercedes MD        Or   • [MAR Hold] dextrose bolus 10% 250 mL  250 mL IntraVENous PRN Philip Mercedes MD       • [MAR Hold] glucagon injection 1 mg  1 mg SubCUTAneous PRN Philip Mercedes MD       • [MAR Hold] dextrose 10 % infusion   IntraVENous Continuous PRN Philip Mercedes MD       • [MAR Hold] dextrose 5 % and 0.45 % sodium chloride infusion   IntraVENous Continuous Philip Mercedes  mL/hr at 24 1340 New Bag at 24 1340   • [MAR Hold] sodium chloride flush 0.9 % injection 5-40 mL  5-40 mL IntraVENous 2 times per day Waterhouse, Shirley Ann, APRN - CNP   10 mL at 24 0930   • [MAR Hold] sodium chloride flush 0.9 % injection 5-40 mL  5-40 mL IntraVENous PRN Waterhouse, Shirley Ann, APRN - CNP       • [MAR Hold] 0.9 % sodium chloride infusion   IntraVENous PRN Waterhouse, Shirley Ann, APRN - CNP       • [MAR Hold] potassium chloride 20 mEq/50 mL IVPB (Central Line)  20 mEq IntraVENous PRN Waterhouse, Shirley Ann, APRN - CNP        Or   • [MAR Hold] potassium chloride 10 mEq/100 mL IVPB (Peripheral Line)  10 mEq IntraVENous PRN

## 2024-05-13 NOTE — OP NOTE
hours, if no symptoms, then start on clears  Monitor for bleeding, abdominal pain, nausea/ vomiting, abdominal bloating, fever, chills  Lactated Ringers IV @ 250 ml/ hour x 8 hours  Check CMP in AM      Electronically signed by   Siva Jensen M.D,[facility] [place] 5/13/2024 at 11:43 AM

## 2024-05-13 NOTE — OP NOTE
linear echoendoscope was introduced orally into esophagus, stomach and duodenum, findings are given below-  Esophagus- normal  Stomach and duodenum- normal  CBD/ GB- stone in CBD  Pancreas- heterogenous parenchyma  Left kidney and adrenal- normal  Left lobe of liver- normal    Pancreas had mild heterogenous architecture, but no masses seen. Peripancreatic blood vessels including spleno-portal axis, main portal vein and superior mesenteric veins are normal. CBD was 12 mm with a stone. Left adrenal gland and left kidney appeared normal. Visualized part of the right and left lobe of liver was normal echotexture. Celiac trunk take- off and descending aorta appeared normal. At the end of the procedure, the endoscope was withdrawn and stomach was decompressed. Patient tolerated procedure well and no immediate complications were encountered.     Impression- EGD- mild gastritis, R/o- H pylori    EUS- Choledocholithiasis    Recommendations/Plan:   F/U Biopsies  Proceed with ERCP      Electronically signed by Siva Jensen MD  on 5/13/2024 at 11:37 AM

## 2024-05-14 VITALS
HEART RATE: 75 BPM | RESPIRATION RATE: 18 BRPM | WEIGHT: 140.65 LBS | TEMPERATURE: 98.4 F | DIASTOLIC BLOOD PRESSURE: 68 MMHG | SYSTOLIC BLOOD PRESSURE: 109 MMHG | BODY MASS INDEX: 25.88 KG/M2 | OXYGEN SATURATION: 98 % | HEIGHT: 62 IN

## 2024-05-14 PROBLEM — K83.1 COMMON BILE DUCT (CBD) OBSTRUCTION: Status: ACTIVE | Noted: 2024-05-14

## 2024-05-14 LAB
ALBUMIN SERPL-MCNC: 3.7 G/DL (ref 3.5–5.2)
ALBUMIN/GLOB SERPL: 2 {RATIO} (ref 1–2.5)
ALP SERPL-CCNC: 174 U/L (ref 40–129)
ALT SERPL-CCNC: 231 U/L (ref 10–50)
ANA SER QL IA: NEGATIVE
ANION GAP SERPL CALCULATED.3IONS-SCNC: 10 MMOL/L (ref 9–16)
AST SERPL-CCNC: 82 U/L (ref 10–50)
BILIRUB SERPL-MCNC: 1 MG/DL (ref 0–1.2)
BUN SERPL-MCNC: 7 MG/DL (ref 6–20)
CALCIUM SERPL-MCNC: 8.7 MG/DL (ref 8.6–10.4)
CHLORIDE SERPL-SCNC: 106 MMOL/L (ref 98–107)
CO2 SERPL-SCNC: 23 MMOL/L (ref 20–31)
CREAT SERPL-MCNC: 0.8 MG/DL (ref 0.7–1.2)
DSDNA IGG SER QL IA: 0.7 IU/ML
GFR, ESTIMATED: >90 ML/MIN/1.73M2
GLUCOSE BLD-MCNC: 114 MG/DL (ref 75–110)
GLUCOSE BLD-MCNC: 118 MG/DL (ref 75–110)
GLUCOSE BLD-MCNC: 150 MG/DL (ref 75–110)
GLUCOSE SERPL-MCNC: 138 MG/DL (ref 74–99)
LIPASE SERPL-CCNC: 46 U/L (ref 13–60)
MITOCHONDRIA M2 IGG SER-ACNC: 1.3 U/ML (ref 0–4)
NUCLEAR IGG SER IA-RTO: 0.2 U/ML
POTASSIUM SERPL-SCNC: 3.9 MMOL/L (ref 3.7–5.3)
PROT SERPL-MCNC: 5.9 G/DL (ref 6.6–8.7)
SODIUM SERPL-SCNC: 139 MMOL/L (ref 136–145)
SURGICAL PATHOLOGY REPORT: NORMAL

## 2024-05-14 PROCEDURE — 6360000002 HC RX W HCPCS: Performed by: STUDENT IN AN ORGANIZED HEALTH CARE EDUCATION/TRAINING PROGRAM

## 2024-05-14 PROCEDURE — 36415 COLL VENOUS BLD VENIPUNCTURE: CPT

## 2024-05-14 PROCEDURE — C9113 INJ PANTOPRAZOLE SODIUM, VIA: HCPCS | Performed by: INTERNAL MEDICINE

## 2024-05-14 PROCEDURE — 2580000003 HC RX 258: Performed by: INTERNAL MEDICINE

## 2024-05-14 PROCEDURE — 6360000002 HC RX W HCPCS: Performed by: INTERNAL MEDICINE

## 2024-05-14 PROCEDURE — 99239 HOSP IP/OBS DSCHRG MGMT >30: CPT | Performed by: STUDENT IN AN ORGANIZED HEALTH CARE EDUCATION/TRAINING PROGRAM

## 2024-05-14 PROCEDURE — 80053 COMPREHEN METABOLIC PANEL: CPT

## 2024-05-14 PROCEDURE — 99231 SBSQ HOSP IP/OBS SF/LOW 25: CPT | Performed by: INTERNAL MEDICINE

## 2024-05-14 PROCEDURE — A4216 STERILE WATER/SALINE, 10 ML: HCPCS | Performed by: INTERNAL MEDICINE

## 2024-05-14 PROCEDURE — 2580000003 HC RX 258: Performed by: STUDENT IN AN ORGANIZED HEALTH CARE EDUCATION/TRAINING PROGRAM

## 2024-05-14 RX ORDER — OMEPRAZOLE 40 MG/1
40 CAPSULE, DELAYED RELEASE ORAL DAILY
Qty: 30 CAPSULE | Refills: 3 | Status: SHIPPED | OUTPATIENT
Start: 2024-05-14

## 2024-05-14 RX ADMIN — SODIUM CHLORIDE, PRESERVATIVE FREE 10 ML: 5 INJECTION INTRAVENOUS at 04:04

## 2024-05-14 RX ADMIN — SODIUM CHLORIDE, PRESERVATIVE FREE 40 MG: 5 INJECTION INTRAVENOUS at 10:19

## 2024-05-14 RX ADMIN — PIPERACILLIN AND TAZOBACTAM 3375 MG: 3; .375 INJECTION, POWDER, LYOPHILIZED, FOR SOLUTION INTRAVENOUS at 05:27

## 2024-05-14 RX ADMIN — SODIUM CHLORIDE, PRESERVATIVE FREE 10 ML: 5 INJECTION INTRAVENOUS at 04:03

## 2024-05-14 RX ADMIN — ENOXAPARIN SODIUM 40 MG: 100 INJECTION SUBCUTANEOUS at 10:20

## 2024-05-14 NOTE — PLAN OF CARE
Problem: Discharge Planning  Goal: Discharge to home or other facility with appropriate resources  5/14/2024 0048 by Izaiah Batista RN  Outcome: Progressing  5/13/2024 1737 by Saida Irwin RN  Outcome: Progressing     Problem: Gastrointestinal - Adult  Goal: Minimal or absence of nausea and vomiting  5/14/2024 0048 by Izaiah Batista RN  Outcome: Progressing  5/13/2024 1737 by Saida Irwin RN  Outcome: Progressing  Goal: Maintains or returns to baseline bowel function  5/14/2024 0048 by Izaiah Batista RN  Outcome: Progressing  5/13/2024 1737 by Saida Irwin RN  Outcome: Progressing  Goal: Maintains adequate nutritional intake  5/14/2024 0048 by Izaiah Batista RN  Outcome: Progressing  5/13/2024 1737 by Saida Irwin RN  Outcome: Progressing     Problem: Infection - Adult  Goal: Absence of infection at discharge  5/14/2024 0048 by Izaiah Batista RN  Outcome: Progressing  5/13/2024 1737 by Saida Irwin RN  Outcome: Progressing  Goal: Absence of infection during hospitalization  5/14/2024 0048 by Izaiah Batista RN  Outcome: Progressing  5/13/2024 1737 by Saida Irwin RN  Outcome: Progressing  Goal: Absence of fever/infection during anticipated neutropenic period  5/14/2024 0048 by Izaiah Batista RN  Outcome: Progressing  5/13/2024 1737 by Saida Irwin RN  Outcome: Progressing     Problem: Metabolic/Fluid and Electrolytes - Adult  Goal: Electrolytes maintained within normal limits  5/14/2024 0048 by Izaiah Batista RN  Outcome: Progressing  5/13/2024 1737 by Saida Irwin RN  Outcome: Progressing  Goal: Hemodynamic stability and optimal renal function maintained  5/14/2024 0048 by Izaiah Batista RN  Outcome: Progressing  5/13/2024 1737 by Saida Irwin RN  Outcome: Progressing  Goal: Glucose maintained within prescribed range  5/14/2024 0048 by Izaiah Batista, RN  Outcome: Progressing  5/13/2024 1737 by Saida Irwin,

## 2024-05-14 NOTE — DISCHARGE SUMMARY
[facility]  Office: [phone]  Leon Cabrera DO, Cleve Matson DO, Brock Ward DO, Philip Lewis DO, Kimberly Pacheco MD, Concha Chacko MD, Janes Sagasutme MD, Samina Dixon MD,  Andrew House MD, Navin Garza MD, Kathy Urbina MD,  Jessica Mclaughlin DO, Philip Mercedes MD, Kulwant Resendiz MD, Swapnil Cabrera DO, Lisa Funez MD,  Miguel Cuevas DO, Elli Terrell MD, Meche Hernandez MD, Delilah Grewal MD, Sveta Reeves MD,  Kyle Almanza MD, Maite Esparza MD, Chin Ledezma MD, Aj Lima MD, Zia Ritchie MD, Tati Andrews MD, Huber Melo DO, Román Liu DO, Gonzales Storm MD,  Marcellus Vo MD, Shirley Waterhouse, CNP,  Janelle Amaya CNP, Jamison Chow, CNP,  María Murphy, DNP, Libra Tolbert, CNP, Sherry Briones, CNP, Alissa Schmitt CNP, Joana Hernandez, CNP, Oly Carlos, PA-C, Carin Dc PA-C, Jannette Fields, CNP, Lisa Keita, CNP, Pankaj Lyons, CNP, Polly Mcarthur, CNP, Janel Nathan, CNP, Brigitte Roman, CNS, Rhina Christian, CNP, Addis Arce CNP, Tracy Schwab, CNP         [facility]   IN-PATIENT SERVICE   [facility]    Discharge Summary     Patient ID: Dagoberto Monroy  :  1975   MRN: [id number]     ACCOUNT:  [id number]   Patient's PCP: No primary care provider on file.  Admit Date: 5/10/2024   Discharge Date: 2024     Length of Stay: 4  Code Status:  Full Code  Admitting Physician: Philip Mercedes MD  Discharge Physician: Tati Andrews MD     Active Discharge Diagnoses:     Hospital Problem Lists:  Principal Problem:    Abdominal pain  Active Problems:    Choledocholithiasis    Elevated liver enzymes    Abdominal pain, epigastric    Abnormal LFTs  Resolved Problems:    * No resolved hospital problems. *      Admission Condition:  fair     Discharged Condition: good    Hospital Stay:     Hospital Course:  Dagoberto Monroy is a 48 y.o. male with PMHx of cholecystectomy, appendectomy, gastritis/PUD

## 2024-05-14 NOTE — PROGRESS NOTES
[facility]   Gastroenterology Progress Note    Dagoberto Monroy is a 48 y.o. male patient.  Hospitalization Day:4      Chief consult reason:   Abdominal pain      Subjective:  Patient examined bedside  Patient states that his abdominal pain is almost gone, denies nausea/vomiting or diarrhea or melena.    Patient had endoscopic ultrasound done yesterday that is on  showed choledocholithiasis sphincterotomy done and stone was removed    Labs this a.m. reviewed: CMP is consistent with elevated LFTs but improved than yesterday <195, <304, AST 82<257    VITALS:  /68   Pulse 75   Temp 98.4 °F (36.9 °C) (Oral)   Resp 18   Ht 1.575 m (5' 2\")   Wt 63.8 kg (140 lb 10.5 oz)   SpO2 98%   BMI 25.73 kg/m²   TEMPERATURE:  Current - Temp: 98.4 °F (36.9 °C); Max - Temp  Av.9 °F (36.6 °C)  Min: 97.3 °F (36.3 °C)  Max: 98.4 °F (36.9 °C)    Physical Exam  HENT:      Head: Normocephalic.      Mouth/Throat:      Mouth: Mucous membranes are moist.   Cardiovascular:      Rate and Rhythm: Normal rate.   Pulmonary:      Effort: Pulmonary effort is normal.   Abdominal:      General: There is no distension.      Palpations: Abdomen is soft.      Tenderness: There is no abdominal tenderness. There is no guarding or rebound.   Musculoskeletal:      Right lower leg: No edema.      Left lower leg: No edema.   Skin:     General: Skin is warm and dry.      Coloration: Skin is not pale.   Neurological:      General: No focal deficit present.      Mental Status: He is alert.   Psychiatric:         Mood and Affect: Mood normal.    Data Review:    Labs and Imaging:     CBC:  No results for input(s): \"WBC\", \"HGB\", \"MCV\", \"RDW\", \"PLT\" in the last 72 hours.    ANEMIA STUDIES:  No results for input(s): \"TIBC\", \"FERRITIN\", \"JDAGWAZL70\", \"FOLATE\", \"OCCULTBLD\" in the last 72 hours.    Invalid input(s): \"LABIRON\"    BMP:  Recent Labs     05/12/24  0543 24  0925 24  0427    138 139   K 
[facility]  Office: [phone]  Leon Cabrera DO, Cleve Matson DO, Brock Ward DO, Philip Lewis DO, Kimberly Pacheco MD, Concha Chacko MD, Janes Sagastume MD, Samina Dixon MD,  Andrew House MD, Navin Garza MD, Kathy Urbina MD,  Jessica Mclaughlin DO, Philip Mercedes MD, Kulwant Resendiz MD, Swapnil Cabrera DO, Lisa Funez MD,  Miguel Cuevas DO, Elli Terrell MD, Meche Hernandez MD, Delilah Grewal MD, Sveta Reeves MD,  Kyle Almanza MD, Maite Esparza MD, Chin Ledezma MD, Aj Lima MD, Zia Ritchie MD, Tati Andrews MD, Huber Melo DO, Román Liu DO, Gonzales Storm MD,  Marcellus Vo MD, Shirley Waterhouse, CNP,  Janelle Amaya CNP, Jamison Chow, CNP,  María Murphy, DNP, Libra Tolbert, CNP, Sherry Briones, CNP, Alissa Schmitt CNP, Joana Hernandez, CNP, Oly Carlos, PA-C, Carin Dc PA-C, Jannette Fields, CNP, Lisa Keita, CNP, Pankaj Lyons, CNP, Polly Mcarthur, CNP, Janel Nathan, CNP, Brigitte Roman, CNS, Rhina Christian, CNP, Addis Arce CNP, Tracy Schwab, CNP         [facility]   IN-PATIENT SERVICE   [facility]    Progress Note    5/11/2024    8:41 AM    Name:   Dagoberto Monroy  MRN:     [id number]     Acct:      [id number]   Room:   0322/0322-02   Day:  1  Admit Date:  5/10/2024  3:24 AM    PCP:   No primary care provider on file.  Code Status:  Full Code    Subjective:     C/C:abdominal pain  Interval History Status: improved    Patient reports no abdominal pain since yesterday  No chest pain or sob  Labs and vitals reviewed  Bp stable  Lft downtrending  Patient wanting to go home  Mri showed possible stone in CBD vs artifact    Brief History:     48-year-old male with past medical history of cholecystectomy, appendectomy, gastritis/PUD presented to [place] ED with mid upper abdominal pain radiating towards the right side.  Patient been started around 4 PM, resolved after few hours.  Denies any fever 
CLINICAL PHARMACY NOTE: MEDS TO BEDS    Total # of Prescriptions Filled: 1   The following medications were delivered to the patient:  omeprazole    Additional Documentation:    
[facility]  Office: [phone]  Leon Cabrera DO, Cleve Matson, DO, Brock Ward DO, Philip Lewis, DO, Kimberly Pacheco MD, Concha Chacko MD, Janes Sagastume MD, Samina Dixon MD,  Andrew House MD, Navin Garza MD, Kathy Urbina MD,  Jessica Mclaughlin DO, Philip Mercedes MD, Kulwant Resendiz MD, Swapnil Cabrera DO, Lisa Funez MD,  Miguel Cuevas DO, Elli Terrell MD, Meche Hernandez MD, Delilah Grewal MD, Sveta Reeves MD,  Kyle Almanza MD, Maite Esparza MD, Chin Ledezma MD, Aj Lima MD, Zia Ritchie MD, Tati Andrews MD, Huber Melo DO, Román Liu DO, Gonzales Storm MD,  Marcellus Vo MD, Shirley Waterhouse, CNP,  Janelle Amaya CNP, Jamison Chow, CNP,  María Murphy, DNP, Libra Tolbert, CNP, Sherry Briones, CNP, Alissa Schmitt CNP, Joana Hernandez, CNP, Oly Carlos, PA-C, Carin Dc PA-C, Jannette Fields, CNP, Lisa Keita, CNP, Pankaj Lyons, CNP, Polly Mcarthur, CNP, Janel Nathan, CNP, Brigitte Roman, CNS, Rhina Christian, CNP, Addis Arce CNP, Tracy Schwab, CNP         [facility]   IN-PATIENT SERVICE   [facility]    Progress Note    5/13/2024    11:55 AM    Name:   Dagoberto Monroy  MRN:     [id number]     Acct:      [id number]   Room:   [facility] Day:  3  Admit Date:  5/10/2024  3:24 AM    PCP:   No primary care provider on file.  Code Status:  Full Code    Subjective:     C/C:abdominal pain  Interval History Status: improved    Patient seen and examined.  He reports an episode of abdominal pain overnight that he felt nauseous.  He ate some [employer].  No fever or chills.  Patient had EGD with endoscopic ultrasound.  Patient was noted to have CBD stone.  He also had mild gastritis.  Lft elevated in the morning.    Brief History:     48-year-old male with past medical history of cholecystectomy, appendectomy, gastritis/PUD presented to [place] ED with mid upper abdominal pain radiating towards the 
[facility] Gastroenterology Progress Note    Dagoberto Monroy is a 48 y.o. male patient.  Hospitalization Day:2      Chief consult reason: Abdominal pain      Subjective: Patient seen and examined.  Patient currently denies any abdominal pain, nausea, vomiting.  Patient informed EUS/ERCP has been moved to a tomorrow      Objective:   VITALS:  /64   Pulse 68   Temp 98 °F (36.7 °C) (Oral)   Resp 16   Ht 1.575 m (5' 2\")   Wt 63.8 kg (140 lb 10.5 oz)   SpO2 97%   BMI 25.73 kg/m²   TEMPERATURE:  Current - Temp: 98 °F (36.7 °C); Max - Temp  Av.2 °F (36.8 °C)  Min: 98 °F (36.7 °C)  Max: 98.4 °F (36.9 °C)    Physical Assessment:  General appearance:  alert, cooperative and no distress  Mental Status:  oriented to person, place and time and normal affect  Lungs: Chest expansion symmetrical, normal effort  Heart:  regular rate and rhythm  Abdomen:  soft, nontender, nondistended  Extremities:  no edema, no redness, No clubbing  Skin:  warm, dry, no gross lesions or rashes    CURRENT MEDICATIONS:  Scheduled Meds:   sodium chloride flush  5-40 mL IntraVENous 2 times per day    enoxaparin  40 mg SubCUTAneous Daily    piperacillin-tazobactam  3,375 mg IntraVENous Q8H    pantoprazole (PROTONIX) 40 mg in sodium chloride (PF) 0.9 % 10 mL injection  40 mg IntraVENous Q12H    sodium chloride  30 mL IntraVENous Once     Continuous Infusions:   dextrose      dextrose 5 % and 0.45 % NaCl 100 mL/hr at 24 1340    sodium chloride       PRN Meds:glucose, dextrose bolus **OR** dextrose bolus, glucagon (rDNA), dextrose, sodium chloride flush, sodium chloride, potassium chloride **OR** potassium chloride, magnesium sulfate, HYDROmorphone **OR** HYDROmorphone, ondansetron **OR** ondansetron, sodium chloride flush      Data Review:  LABS and IMAGING:     CBC  Recent Labs     24  1855   WBC 11.3*   HGB 15.4   HCT 43.1   MCV 87.4   MCHC 35.7   RDW 11.6*          Immature PLTs   No results found for: 
[facility]  Office: [phone]  Leon Cabrera DO, Cleve Matson DO, Brock Ward DO, Philip Lewis DO, Kimberly Pacheco MD, Concha Chacko MD, Janes Sagastume MD, Samina Dixon MD,  Andrew House MD, Navin Garza MD, Kathy Urbina MD,  Jessica Mclaughlin DO, Philip Mercedes MD, Kulwant Resendiz MD, Swapnil Cabrera DO, Lisa Funez MD,  Miguel Cuevas DO, Elli Terrell MD, Meche Hernandez MD, Delilah Grewal MD, Sveta Reeves MD,  Kyle Almanza MD, Maite Esparza MD, Chin Ledezma MD, Aj Lima MD, Zia Ritchie MD, Tait Andrews MD, Huber Melo DO, Román Liu DO, Gonzales Storm MD,  Marcellus Vo MD, Shirley Waterhouse, CNP,  Janelle Amaya CNP, Jamison Chow, CNP,  María Murphy, DNP, Libra Tolbert, CNP, Sherry Briones, CNP, Alissa Schmitt, CNP, Joana Hernandez, CNP, Oly Carlos, PA-C, Carin Dc PA-C, Jannette Fields, CNP, Lisa Keita, CNP, Pankaj Lyons, CNP, Polly Mcarthur, CNP, Janel Nathan, CNP, Brigitte Roman, CNS, Rhina Christian, CNP, Addis Arce CNP, Tracy Schwab, CNP         [facility]   IN-PATIENT SERVICE   [facility]    Second Visit Note  For more detailed information please refer to the progress note of the day      5/10/2024    11:42 AM    Name:   Dagoberto Monroy  MRN:     [id number]     Acct:      [id number]   Room:   0322/0322-02   Day:  0  Admit Date:  5/10/2024  3:24 AM    PCP:   No primary care provider on file.  Code Status:  Full Code      Pt vitals were reviewed   New labs were reviewed   Patient was seen and examined, just got his ultrasound done, he is currently denying any acute pain    Updated plan :     Repeat labs  GI ordered MRCP  Continue supportive care  Can have a diet after MRCP      Samina Dixon MD  5/10/2024  11:42 AM    
[facility]  Office: [phone]  Leon Cabrera DO, Clvee Matson, DO, Brock Ward DO, Philip Lewis DO, Kimberly Pacheco MD, Concha Chacko MD, Janes Sagastume MD, Samina Dixon MD,  Andrew House MD, Navin Garza MD, Kathy Urbina MD,  Jessica Mclaughlin DO, Philip Mercedes MD, Kulwant Resendiz MD, Swapnil Cabrera DO, Lisa Funez MD,  Miguel Cuevas DO, Elli Terrell MD, Meche Hernandez MD, Delilah Grewal MD, Sveta Reeves MD,  Kyle Almanza MD, Maite Esparza MD, Chin Ledezma MD, Aj Lima MD, Zia Ritchie MD, Tati Andrews MD, Huber Melo DO, Román Liu DO, Gonzales Storm MD,  Marcellus Vo MD, Shirley Waterhouse, CNP,  Janelle Amaya CNP, Jamison Chow, CNP,  María Murphy, DNP, Libra Tolbert CNP, Sherry Briones, CNP, Alissa Schmitt CNP, Joana Hernandez, CNP, Oly Carlos, PA-C, Carin Dc PA-C, Jannette Fields, CNP, Lisa Keita, CNP, Pankaj Lyons CNP, Polly Mcarthur CNP, Janel Nathan CNP, Brigitte Roman, CNS, Rhina Christian, CNP, Addis Arce CNP, Tracy Schwab, CNP         [facility]   IN-PATIENT SERVICE   [facility]    Progress Note    5/12/2024    12:51 PM    Name:   Dagoberto Monroy  MRN:     [id number]     Acct:      [id number]   Room:   0322/0322-02   Day:  2  Admit Date:  5/10/2024  3:24 AM    PCP:   No primary care provider on file.  Code Status:  Full Code    Subjective:     C/C:abdominal pain  Interval History Status: improved    Patient reports no abdominal pain   No chest pain or sob  Ercp postponed  Labs and vitals reviewed  Bp stable  Lft downtrending  Brief History:     48-year-old male with past medical history of cholecystectomy, appendectomy, gastritis/PUD presented to [place] ED with mid upper abdominal pain radiating towards the right side.  Patient been started around 4 PM, resolved after few hours.  Denies any fever chills shortness of breath or chest pain.  Patient mentioned that he has been 
05/10/24  2115 05/11/24  0146   NA  --  141 139 140   K  --  3.8 4.0 4.0   CL  --  107 107 106   CO2  --  22 22 21   BUN  --  15 13 13   CREATININE  --  0.8 1.0 0.9   GLUCOSE  --  80 80 49*   CALCIUM  --  8.7 8.8 9.0   MG 2.2  --   --   --        LFTS:  Recent Labs     05/09/24  1855 05/10/24  1239 05/10/24  2115 05/11/24  0146   ALKPHOS 191* 179* 179* 176*   * 219* 183* 189*   * 119* 85* 88*   BILITOT 1.8* 1.6* 1.6* 1.7*       Amylase/Lipase and Ammonia:  Recent Labs     05/09/24  1855 05/10/24  0611   LIPASE 26 19       Acute Hepatitis Panel:  Lab Results   Component Value Date/Time    HEPBSAG NONREACTIVE 05/10/2024 12:39 PM    HEPCAB NONREACTIVE 05/10/2024 12:39 PM    HEPBIGM NONREACTIVE 05/10/2024 12:39 PM    HEPAIGM NONREACTIVE 05/10/2024 12:39 PM       HCV Genotype:  No components found for: \"HEPATITISCGENOTYPE\"    HCV Quantitative:  No results found for: \"HCVQNT\"    LIVER WORK UP:    AFP  Lab Results   Component Value Date/Time    AFP 3.0 05/10/2024 12:39 PM       Alpha 1 antitrypsin   Lab Results   Component Value Date/Time    A1A 107 05/10/2024 12:39 PM       DANIAL  No results found for: \"DANIAL\"    AMA  No results found for: \"MITOAB\"    ASMA  No results found for: \"SMOOTHMUSCAB\"    PT/INR  Recent Labs     05/10/24  0611   PROTIME 14.0   INR 1.1       Cancer Markers:  CEA:  No results for input(s): \"CEA\" in the last 72 hours.  Ca 125:  No results for input(s): \"\" in the last 72 hours.  Ca 19-9:   Invalid input(s): \"\"  AFP:   Recent Labs     05/10/24  1239   AFP 3.0     Lactic acid:Invalid input(s): \"LACTIC ACID\"    Radiology Review:    US LIVER    Result Date: 5/10/2024  EXAMINATION: RIGHT UPPER QUADRANT ULTRASOUND 5/10/2024 7:45 am COMPARISON: None. HISTORY: ORDERING SYSTEM PROVIDED HISTORY: elevated liver enzymes, s/p pedro luis TECHNOLOGIST PROVIDED HISTORY: elevated liver enzymes, s/p pedro luis FINDINGS: LIVER:  The liver demonstrates normal echogenicity without evidence of intrahepatic 
Toilet: Standard  Bathroom Equipment: None  Home Equipment: None  ADL Assistance: Independent  Homemaking Assistance: Independent  Homemaking Responsibilities: Yes (son assists)  Meal Prep Responsibility: Primary  Laundry Responsibility: Primary  Cleaning Responsibility: Primary  Shopping Responsibility: Primary  Ambulation Assistance: Independent  Transfer Assistance: Independent  Active : Yes  Mode of Transportation: Car, SUV, Truck  Occupation: Full time employment  Type of Occupation: Assistance Supervisor  Leisure & Hobbies: Work, Help out around home     Objective   Safety Devices  Type of Devices: Nurse notified;Left in bed;Gait belt;Call light within reach  Restraints  Restraints Initially in Place: No  Balance  Sitting: Intact (EOB for ~8-9 min independently)  Standing: Intact (within room, in hallway ~2-3 min independent)     AROM: Within functional limits  Strength: Within functional limits  Coordination: Within functional limits  Tone: Normal  Sensation: Intact  ADL  Feeding: Independent  Grooming: Independent  UE Bathing: Independent  LE Bathing: Independent  UE Dressing: Independent  LE Dressing: Independent  LE Dressing Skilled Clinical Factors: Patient doffed B socks seated long sitting in bed and donned B socks EOB independently  Toileting: Independent  Functional Mobility: Independent  Functional Mobility Skilled Clinical Factors: Pt completed functional mobility within room and into hallway independently with no acute concers and reports no deficits;     Activity Tolerance  Activity Tolerance: Patient tolerated treatment well  Bed mobility  Supine to Sit: Independent  Sit to Supine: Independent  Scooting: Independent  Transfers  Sit to stand: Independent  Stand to sit: Independent  Vision  Vision: Impaired  Vision Exceptions: Wears glasses for reading  Hearing  Hearing: Within functional limits  Cognition  Overall Cognitive Status: WFL  Orientation  Overall Orientation Status: Within 
on the side of a flat bed without using bedrails?: None  How much help is needed moving to and from a bed to a chair?: None  How much help is needed standing up from a chair using your arms?: None  How much help is needed walking in hospital room?: None  How much help is needed climbing 3-5 steps with a railing?: None  AM-PAC Inpatient Mobility Raw Score : 24  AM-PAC Inpatient T-Scale Score : 61.14  Mobility Inpatient CMS 0-100% Score: 0  Mobility Inpatient CMS G-Code Modifier : CH       Tinneti Score       Goals  Short Term Goals  Time Frame for Short Term Goals: No PT needs at this time      DC   PT       Education  Patient Education  Education Given To: Patient  Education Provided: Role of Therapy;Plan of Care  Education Method: Verbal  Barriers to Learning: None      Therapy Time   Individual Concurrent Group Co-treatment   Time In 0855         Time Out 0925         Minutes 30                 Arcadio Shannon, PT         
during imaging, blurring detail and resulting in misregistration artifact. Most of the imaging series presented are nondiagnostic. 2. Possible stone at the distal common duct, noting that artifact easily could have this appearance.  Images are significantly degraded.  Consider ERCP correlation. 3. Other portions of the MRI abdomen appear unremarkable.     US LIVER    Result Date: 5/10/2024  Tiny simple cyst within the liver. Status post cholecystectomy. Otherwise unremarkable right upper quadrant sonography.     CT ABDOMEN PELVIS W IV CONTRAST Additional Contrast? None    Result Date: 5/9/2024  Mild diffuse wall thickening of the urinary bladder is seen, which can be seen with mild infectious/inflammatory process. Please correlate clinically.       Physical Examination:        General appearance:  alert, cooperative and no distress  Mental Status:  oriented to person, place and time and normal affect  Lungs:  clear to auscultation bilaterally, normal effort  Heart:  regular rate and rhythm, no murmur  Abdomen:  soft, nontender, nondistended, normal bowel sounds, no masses, hepatomegaly, splenomegaly  Extremities:  no edema, redness, tenderness in the calves  Skin:  no gross lesions, rashes, induration    Assessment:        Hospital Problems             Last Modified POA    * (Principal) Abdominal pain 5/10/2024 Yes    Choledocholithiasis 5/13/2024 Yes    Elevated liver enzymes 5/10/2024 Yes    Abdominal pain, epigastric 5/10/2024 Yes    Abnormal LFTs 5/11/2024 Yes       Plan:        Elevated transaminitis- down trending   S/p ERCP, EUS with stone removal   Noted to have mild gastritis on EGD, follow-up with biopsy, continue Protonix  Noted to have choledocholithiasis and ERCP was done with sphincterotomy and removal of stone   Already has cholecystectomy  Tolerating oral diet, started on regular low fat diet   Continue IVF  Continue zosyn for now  Hepatitis panel negative     Discharge when cleared by AMARI Duffy

## 2024-05-14 NOTE — PLAN OF CARE
Problem: Discharge Planning  Goal: Discharge to home or other facility with appropriate resources  5/14/2024 1332 by Rosemary Hayes RN  Outcome: Progressing  5/14/2024 0048 by Izaiah Batista RN  Outcome: Progressing     Problem: Gastrointestinal - Adult  Goal: Minimal or absence of nausea and vomiting  5/14/2024 1332 by Rosemary Hayes RN  Outcome: Progressing  5/14/2024 0048 by Izaiah Batista RN  Outcome: Progressing  Goal: Maintains or returns to baseline bowel function  5/14/2024 1332 by Rosemary Hayes RN  Outcome: Progressing  5/14/2024 0048 by Izaiah Batista RN  Outcome: Progressing  Goal: Maintains adequate nutritional intake  5/14/2024 1332 by Rosemary Hayes RN  Outcome: Progressing  5/14/2024 0048 by Izaiah Batista RN  Outcome: Progressing     Problem: Infection - Adult  Goal: Absence of infection at discharge  5/14/2024 1332 by Rosemary Hayes RN  Outcome: Progressing  5/14/2024 0048 by Izaiah Batista RN  Outcome: Progressing  Goal: Absence of infection during hospitalization  5/14/2024 1332 by Rosemary Hayes RN  Outcome: Progressing  5/14/2024 0048 by Izaiah Batista RN  Outcome: Progressing  Goal: Absence of fever/infection during anticipated neutropenic period  5/14/2024 1332 by Rosemary Hayes RN  Outcome: Progressing  5/14/2024 0048 by Izaiah Batista RN  Outcome: Progressing     Problem: Metabolic/Fluid and Electrolytes - Adult  Goal: Electrolytes maintained within normal limits  5/14/2024 1332 by Rosemary Hayes RN  Outcome: Progressing  5/14/2024 0048 by Izaiah Batista RN  Outcome: Progressing  Goal: Hemodynamic stability and optimal renal function maintained  5/14/2024 1332 by Rosemary Hayes RN  Outcome: Progressing  5/14/2024 0048 by Izaiah Batista RN  Outcome: Progressing  Goal: Glucose maintained within prescribed range  5/14/2024 1332 by Hayes, Kellene M, RN  Outcome: Progressing  5/14/2024 0048 by Izaiah Batista,

## 2025-01-05 ENCOUNTER — HOSPITAL ENCOUNTER (EMERGENCY)
Age: 50
Discharge: HOME | End: 2025-01-05
Attending: EMERGENCY MEDICINE
Payer: MEDICAID

## 2025-01-05 VITALS
RESPIRATION RATE: 18 BRPM | DIASTOLIC BLOOD PRESSURE: 85 MMHG | HEART RATE: 84 BPM | SYSTOLIC BLOOD PRESSURE: 140 MMHG | TEMPERATURE: 98.3 F | OXYGEN SATURATION: 96 %

## 2025-01-05 DIAGNOSIS — U07.1 COVID-19: Primary | ICD-10-CM

## 2025-01-05 LAB
FLUAV AG SPEC QL: NEGATIVE
FLUBV AG SPEC QL: NEGATIVE
SARS-COV-2 RDRP RESP QL NAA+PROBE: DETECTED
SPECIMEN DESCRIPTION: ABNORMAL

## 2025-01-05 PROCEDURE — 99283 EMERGENCY DEPT VISIT LOW MDM: CPT

## 2025-01-05 PROCEDURE — 87804 INFLUENZA ASSAY W/OPTIC: CPT

## 2025-01-05 PROCEDURE — 87635 SARS-COV-2 COVID-19 AMP PRB: CPT

## 2025-01-05 ASSESSMENT — PAIN DESCRIPTION - LOCATION: LOCATION: GENERALIZED

## 2025-01-05 ASSESSMENT — PAIN DESCRIPTION - PAIN TYPE: TYPE: ACUTE PAIN

## 2025-01-05 ASSESSMENT — PAIN DESCRIPTION - FREQUENCY: FREQUENCY: CONTINUOUS

## 2025-01-05 ASSESSMENT — PAIN DESCRIPTION - DESCRIPTORS: DESCRIPTORS: ACHING

## 2025-01-05 ASSESSMENT — PAIN - FUNCTIONAL ASSESSMENT: PAIN_FUNCTIONAL_ASSESSMENT: 0-10

## 2025-01-06 NOTE — ED PROVIDER NOTES
[facility]  EMERGENCY DEPARTMENT  eMERGENCY dEPARTMENT eNCOUnter      Pt Name: Dagoberto Monroy  MRN: [id number]  Birthdate 1975  Date of evaluation: 1/5/2025  Provider: Rad Elizabeth MD    CHIEF COMPLAINT       Chief Complaint   Patient presents with    Concern For COVID-19    Fever    Generalized Body Aches     Pt here for body aches and cough and fever since sat     Patient is a 49-year-old male who presents to the emergency department complaining of bodyaches and concern for COVID.  He states he has a family member with COVID.  He denies any chest pain or shortness of breath.  He denies any significant cough.  He denies any other complaints        Nursing Notes were reviewed.    REVIEW OF SYSTEMS    (2-9 systems for level 4, 10 or more for level 5)     Review of Systems    Except as noted above the remainder of the review of systems was reviewed and negative.       PAST MEDICAL HISTORY     Past Medical History:   Diagnosis Date    Biliary colic 05/31/2013    Kidney stone          SURGICAL HISTORY       Past Surgical History:   Procedure Laterality Date    APPENDECTOMY  01/01/1991    in [place]    CHOLECYSTECTOMY  06/11/2013    LAPAROSCOPIC    ERCP  05/13/2024    ENDOSCOPIC RETROGRADE CHOLANGIOPANCREATOGRAPHY SPHINCTER/PAPILLOTOMY, STONE REMOVAL    ERCP N/A 5/13/2024    ENDOSCOPIC RETROGRADE CHOLANGIOPANCREATOGRAPHY STONE REMOVAL performed by Siva Jensen MD at [facility] OR    ERCP  5/13/2024    ENDOSCOPIC RETROGRADE CHOLANGIOPANCREATOGRAPHY SPHINCTER/PAPILLOTOMY performed by Siva Jensen MD at [facility] OR    ESOPHAGOGASTRODUODENOSCOPY  05/13/2024    BIOPSY    UPPER GASTROINTESTINAL ENDOSCOPY N/A 5/13/2024    ENDOSCOPIC ULTRASOUND performed by Siva Jensen MD at [facility] OR    UPPER GASTROINTESTINAL ENDOSCOPY  5/13/2024    ESOPHAGOGASTRODUODENOSCOPY BIOPSY performed by Siva Jensen MD at [facility] OR         ALLERGIES     Patient has no known allergies.    FAMILY HISTORY     No family history on file.       SOCIAL HISTORY

## 2025-04-04 ENCOUNTER — HOSPITAL ENCOUNTER (OUTPATIENT)
Age: 50
Discharge: HOME | End: 2025-04-04
Payer: MEDICAID

## 2025-04-04 ENCOUNTER — OFFICE VISIT (OUTPATIENT)
Dept: FAMILY MEDICINE CLINIC | Age: 50
End: 2025-04-04

## 2025-04-04 VITALS
BODY MASS INDEX: 27.62 KG/M2 | HEART RATE: 75 BPM | DIASTOLIC BLOOD PRESSURE: 70 MMHG | OXYGEN SATURATION: 98 % | SYSTOLIC BLOOD PRESSURE: 110 MMHG | WEIGHT: 151 LBS

## 2025-04-04 DIAGNOSIS — Z13.220 SCREENING FOR HYPERLIPIDEMIA: ICD-10-CM

## 2025-04-04 DIAGNOSIS — K90.49 FOOD INTOLERANCE: ICD-10-CM

## 2025-04-04 DIAGNOSIS — R10.32 LEFT LOWER QUADRANT ABDOMINAL PAIN: Primary | ICD-10-CM

## 2025-04-04 DIAGNOSIS — Z13.1 SCREENING FOR DIABETES MELLITUS (DM): ICD-10-CM

## 2025-04-04 DIAGNOSIS — L91.0 HYPERTROPHIC SCAR: ICD-10-CM

## 2025-04-04 DIAGNOSIS — R12 HEARTBURN: ICD-10-CM

## 2025-04-04 DIAGNOSIS — Z00.00 ENCOUNTER FOR MEDICAL EXAMINATION TO ESTABLISH CARE: ICD-10-CM

## 2025-04-04 DIAGNOSIS — Z12.11 SCREENING FOR COLON CANCER: ICD-10-CM

## 2025-04-04 DIAGNOSIS — B35.1 ONYCHOMYCOSIS DUE TO DERMATOPHYTE: ICD-10-CM

## 2025-04-04 DIAGNOSIS — Z87.19 HISTORY OF CONSTIPATION: ICD-10-CM

## 2025-04-04 PROBLEM — R74.8 ELEVATED LIVER ENZYMES: Status: RESOLVED | Noted: 2024-05-10 | Resolved: 2025-04-04

## 2025-04-04 PROBLEM — R10.13 ABDOMINAL PAIN, EPIGASTRIC: Status: RESOLVED | Noted: 2024-05-10 | Resolved: 2025-04-04

## 2025-04-04 PROBLEM — R79.89 ABNORMAL LFTS: Status: RESOLVED | Noted: 2024-05-11 | Resolved: 2025-04-04

## 2025-04-04 PROBLEM — K83.1 COMMON BILE DUCT (CBD) OBSTRUCTION (HCC): Status: RESOLVED | Noted: 2024-05-14 | Resolved: 2025-04-04

## 2025-04-04 LAB
ALBUMIN SERPL-MCNC: 4.2 G/DL (ref 3.5–5.2)
ALBUMIN/GLOB SERPL: 1.5 {RATIO} (ref 1–2.5)
ALP SERPL-CCNC: 119 U/L (ref 40–129)
ALT SERPL-CCNC: 23 U/L (ref 5–41)
ANION GAP SERPL CALCULATED.3IONS-SCNC: 8 MMOL/L (ref 9–17)
AST SERPL-CCNC: 24 U/L
BASOPHILS # BLD: 0.02 K/UL (ref 0–0.2)
BASOPHILS NFR BLD: 0 % (ref 0–2)
BILIRUB SERPL-MCNC: 0.6 MG/DL (ref 0.3–1.2)
BUN SERPL-MCNC: 10 MG/DL (ref 6–20)
CALCIUM SERPL-MCNC: 9 MG/DL (ref 8.6–10.4)
CHLORIDE SERPL-SCNC: 104 MMOL/L (ref 98–107)
CO2 SERPL-SCNC: 27 MMOL/L (ref 20–31)
CREAT SERPL-MCNC: 1 MG/DL (ref 0.7–1.2)
EOSINOPHIL # BLD: 0.06 K/UL (ref 0–0.4)
EOSINOPHILS RELATIVE PERCENT: 1 % (ref 0–5)
ERYTHROCYTE [DISTWIDTH] IN BLOOD BY AUTOMATED COUNT: 11.6 % (ref 12.1–15.2)
GFR, ESTIMATED: >90 ML/MIN/1.73M2
GLUCOSE SERPL-MCNC: 109 MG/DL (ref 70–99)
HCT VFR BLD AUTO: 41.6 % (ref 41–53)
HGB BLD-MCNC: 15 G/DL (ref 13.5–17.5)
IMM GRANULOCYTES # BLD AUTO: 0.02 K/UL (ref 0–0.3)
IMM GRANULOCYTES NFR BLD: 0 % (ref 0–5)
LYMPHOCYTES NFR BLD: 2.21 K/UL (ref 1–4.8)
LYMPHOCYTES RELATIVE PERCENT: 30 % (ref 13–44)
MCH RBC QN AUTO: 31.7 PG (ref 26–34)
MCHC RBC AUTO-ENTMCNC: 36.1 G/DL (ref 31–37)
MCV RBC AUTO: 87.9 FL (ref 80–100)
MONOCYTES NFR BLD: 0.59 K/UL (ref 0–1)
MONOCYTES NFR BLD: 8 % (ref 5–9)
NEUTROPHILS NFR BLD: 61 % (ref 39–75)
NEUTS SEG NFR BLD: 4.59 K/UL (ref 2.1–6.5)
PLATELET # BLD AUTO: 199 K/UL (ref 140–450)
PMV BLD AUTO: 9.6 FL (ref 6–12)
POTASSIUM SERPL-SCNC: 3.8 MMOL/L (ref 3.7–5.3)
PROT SERPL-MCNC: 7 G/DL (ref 6.4–8.3)
RBC # BLD AUTO: 4.73 M/UL (ref 4.5–5.9)
SODIUM SERPL-SCNC: 139 MMOL/L (ref 135–144)
WBC OTHER # BLD: 7.5 K/UL (ref 3.5–11)

## 2025-04-04 PROCEDURE — 80053 COMPREHEN METABOLIC PANEL: CPT

## 2025-04-04 PROCEDURE — 85025 COMPLETE CBC W/AUTO DIFF WBC: CPT

## 2025-04-04 PROCEDURE — 36415 COLL VENOUS BLD VENIPUNCTURE: CPT

## 2025-04-04 PROCEDURE — 83036 HEMOGLOBIN GLYCOSYLATED A1C: CPT

## 2025-04-04 RX ORDER — TERBINAFINE HYDROCHLORIDE 250 MG/1
250 TABLET ORAL DAILY
Qty: 84 TABLET | Refills: 0 | Status: SHIPPED | OUTPATIENT
Start: 2025-04-04 | End: 2025-06-27

## 2025-04-04 RX ORDER — PANTOPRAZOLE SODIUM 40 MG/1
40 TABLET, DELAYED RELEASE ORAL
Qty: 30 TABLET | Refills: 2 | Status: SHIPPED | OUTPATIENT
Start: 2025-04-04

## 2025-04-04 RX ORDER — TRIAMCINOLONE ACETONIDE 1 MG/G
CREAM TOPICAL
Qty: 28.4 G | Refills: 1 | Status: SHIPPED | OUTPATIENT
Start: 2025-04-04

## 2025-04-04 SDOH — ECONOMIC STABILITY: FOOD INSECURITY: WITHIN THE PAST 12 MONTHS, THE FOOD YOU BOUGHT JUST DIDN'T LAST AND YOU DIDN'T HAVE MONEY TO GET MORE.: NEVER TRUE

## 2025-04-04 SDOH — ECONOMIC STABILITY: FOOD INSECURITY: WITHIN THE PAST 12 MONTHS, YOU WORRIED THAT YOUR FOOD WOULD RUN OUT BEFORE YOU GOT MONEY TO BUY MORE.: NEVER TRUE

## 2025-04-04 ASSESSMENT — ENCOUNTER SYMPTOMS
WHEEZING: 0
NAUSEA: 0
VOMITING: 0
SINUS PAIN: 0
BACK PAIN: 0
ABDOMINAL PAIN: 1
DIARRHEA: 1
SORE THROAT: 0
COUGH: 0

## 2025-04-04 ASSESSMENT — PATIENT HEALTH QUESTIONNAIRE - PHQ9
SUM OF ALL RESPONSES TO PHQ QUESTIONS 1-9: 0
SUM OF ALL RESPONSES TO PHQ QUESTIONS 1-9: 0
2. FEELING DOWN, DEPRESSED OR HOPELESS: NOT AT ALL
SUM OF ALL RESPONSES TO PHQ QUESTIONS 1-9: 0
1. LITTLE INTEREST OR PLEASURE IN DOING THINGS: NOT AT ALL
SUM OF ALL RESPONSES TO PHQ QUESTIONS 1-9: 0

## 2025-04-04 NOTE — PATIENT INSTRUCTIONS
SURVEY:    You may be receiving a survey from [facility] regarding your visit today.    You may get this in the mail, through your MyChart or in your email.     Please complete the survey to enable us to provide the highest quality of care to you and your family.    If you cannot score us as very good ( 5 Stars) on any question, please feel free to call the office to discuss how we could have made your experience exceptional.     Thank you.    Clinical Care Team:  Dr. Matt Das, DO Marci Chan LPN    Triage:  Dilia Loco CMA    Clerical Team:  Dilia Tipton

## 2025-04-04 NOTE — PROGRESS NOTES
HPI Notes    Name: Dagoberto Monroy  : 1975         Chief Complaint:     Chief Complaint   Patient presents with    New Patient     Establishing care.    Heartburn     Patient takes Prilosec every day to manage symptoms.     Abdominal Pain     Patient has 2-3 loose bowel movement every day. He has difficulty having a bowel movements, stating it is very hard to push. Hx of hemorrhoids. He does experience left side abdominal pain which is worse after drinking milk or coffee and occasional blood after wiping.    Joint Pain     Patient is experiencing joint pain in wrists, hands and legs.         History of Present Illness:      HPI    This is a 49-year-old gentleman who has never seen primary care presenting to establish care with new primary care provider.  He was seen in a hospital visit for ERCP with a sphincterotomy last May.  I did review notes and documentation from that encounter.  LFTs markedly improved after that procedure he is accompanied by his son, who is also my patient.  He has a few concerns that he wishes to discuss today, namely heartburn as well as abdominal pain, frequent bowel movements and difficulty defecating.    Heartburn: This is historically been treated with Prilosec 40 mg p.o. daily ever since his ERCP on 5/10/2024. This is a retrosternal burning sensation worse after eating. Prilosec helps but incompletely. This is a historic problem even going back to his youth, growing up in [place].     Abdominal pain: He experiences left lower quadrant abdominal pain worse after drinking milk or coffee. This latter problem is an intermittent problem and often accompanied by diarrhea (2-3 loose bowel movements) and abdominal bloating. These symptoms also occur with eating sour cream, cheese, yogurt and ice cream although he notes he rarely eats dairy.     Historically, he has had what sounds to be some episodes of severe constipation in which it had became very difficult for him to defecate.

## 2025-04-05 LAB
EST. AVERAGE GLUCOSE BLD GHB EST-MCNC: 103 MG/DL
HBA1C MFR BLD: 5.2 % (ref 4–6)

## 2025-04-06 ENCOUNTER — HOSPITAL ENCOUNTER (OUTPATIENT)
Age: 50
Setting detail: SPECIMEN
Discharge: HOME | End: 2025-04-06
Payer: MEDICAID

## 2025-04-06 DIAGNOSIS — K90.49 FOOD INTOLERANCE: ICD-10-CM

## 2025-04-06 DIAGNOSIS — R10.32 LEFT LOWER QUADRANT ABDOMINAL PAIN: ICD-10-CM

## 2025-04-06 PROCEDURE — 87338 HPYLORI STOOL AG IA: CPT

## 2025-04-07 ENCOUNTER — RESULTS FOLLOW-UP (OUTPATIENT)
Dept: FAMILY MEDICINE CLINIC | Age: 50
End: 2025-04-07

## 2025-04-07 LAB
MICROORGANISM/AGENT SPEC: NEGATIVE
SPECIMEN DESCRIPTION: NORMAL

## (undated) DEVICE — SPHINCTEROTOME: Brand: DREAMTOME™ RX 44

## (undated) DEVICE — ELECTRODE PT RET AD L9FT HI MOIST COND ADH HYDRGEL CORDED

## (undated) DEVICE — SINGLE-USE BIOPSY FORCEPS: Brand: RADIAL JAW 4

## (undated) DEVICE — PROTECTOR ULN NRV PUR FOAM HK LOOP STRP ANATOMICALLY

## (undated) DEVICE — GARMENT,MEDLINE,DVT,INT,CALF,MED, GEN2: Brand: MEDLINE

## (undated) DEVICE — STRAP,POSITIONING,KNEE/BODY,FOAM,4X60": Brand: MEDLINE

## (undated) DEVICE — RETRIEVAL BALLOON CATHETER: Brand: EXTRACTOR™ PRO RX